# Patient Record
Sex: FEMALE | Race: BLACK OR AFRICAN AMERICAN | NOT HISPANIC OR LATINO | URBAN - METROPOLITAN AREA
[De-identification: names, ages, dates, MRNs, and addresses within clinical notes are randomized per-mention and may not be internally consistent; named-entity substitution may affect disease eponyms.]

---

## 2018-06-07 ENCOUNTER — INPATIENT (INPATIENT)
Facility: HOSPITAL | Age: 68
LOS: 3 days | Discharge: HOME HEALTH | DRG: 640 | End: 2018-06-11
Attending: INTERNAL MEDICINE | Admitting: INTERNAL MEDICINE
Payer: MEDICAID

## 2018-06-07 VITALS
SYSTOLIC BLOOD PRESSURE: 151 MMHG | HEART RATE: 96 BPM | DIASTOLIC BLOOD PRESSURE: 82 MMHG | TEMPERATURE: 98 F | OXYGEN SATURATION: 97 % | RESPIRATION RATE: 18 BRPM

## 2018-06-07 DIAGNOSIS — R62.7 ADULT FAILURE TO THRIVE: ICD-10-CM

## 2018-06-07 LAB
ALBUMIN SERPL ELPH-MCNC: 2.8 G/DL — LOW (ref 3.3–5)
ALBUMIN SERPL ELPH-MCNC: 3.3 G/DL — SIGNIFICANT CHANGE UP (ref 3.3–5)
ALP SERPL-CCNC: 185 U/L — HIGH (ref 40–120)
ALP SERPL-CCNC: 219 U/L — HIGH (ref 40–120)
ALT FLD-CCNC: 13 U/L — SIGNIFICANT CHANGE UP (ref 10–45)
ALT FLD-CCNC: 15 U/L — SIGNIFICANT CHANGE UP (ref 10–45)
ANION GAP SERPL CALC-SCNC: 10 MMOL/L — SIGNIFICANT CHANGE UP (ref 5–17)
ANION GAP SERPL CALC-SCNC: 15 MMOL/L — SIGNIFICANT CHANGE UP (ref 5–17)
APPEARANCE UR: CLEAR — SIGNIFICANT CHANGE UP
APTT BLD: 34.9 SEC — SIGNIFICANT CHANGE UP (ref 27.5–37.4)
AST SERPL-CCNC: 12 U/L — SIGNIFICANT CHANGE UP (ref 10–40)
AST SERPL-CCNC: 12 U/L — SIGNIFICANT CHANGE UP (ref 10–40)
BASE EXCESS BLDV CALC-SCNC: -0.3 MMOL/L — SIGNIFICANT CHANGE UP (ref -2–2)
BASOPHILS # BLD AUTO: 0 K/UL — SIGNIFICANT CHANGE UP (ref 0–0.2)
BASOPHILS NFR BLD AUTO: 0.3 % — SIGNIFICANT CHANGE UP (ref 0–2)
BILIRUB DIRECT SERPL-MCNC: <0.1 MG/DL — SIGNIFICANT CHANGE UP (ref 0–0.2)
BILIRUB INDIRECT FLD-MCNC: >0 MG/DL — LOW (ref 0.2–1)
BILIRUB SERPL-MCNC: 0.1 MG/DL — LOW (ref 0.2–1.2)
BILIRUB SERPL-MCNC: 0.1 MG/DL — LOW (ref 0.2–1.2)
BILIRUB UR-MCNC: NEGATIVE — SIGNIFICANT CHANGE UP
BUN SERPL-MCNC: 45 MG/DL — HIGH (ref 7–23)
BUN SERPL-MCNC: 56 MG/DL — HIGH (ref 7–23)
CA-I SERPL-SCNC: 1.22 MMOL/L — SIGNIFICANT CHANGE UP (ref 1.12–1.3)
CALCIUM SERPL-MCNC: 8.4 MG/DL — SIGNIFICANT CHANGE UP (ref 8.4–10.5)
CALCIUM SERPL-MCNC: 9.1 MG/DL — SIGNIFICANT CHANGE UP (ref 8.4–10.5)
CHLORIDE BLDV-SCNC: 108 MMOL/L — SIGNIFICANT CHANGE UP (ref 96–108)
CHLORIDE SERPL-SCNC: 101 MMOL/L — SIGNIFICANT CHANGE UP (ref 96–108)
CHLORIDE SERPL-SCNC: 105 MMOL/L — SIGNIFICANT CHANGE UP (ref 96–108)
CO2 BLDV-SCNC: 26 MMOL/L — SIGNIFICANT CHANGE UP (ref 22–30)
CO2 SERPL-SCNC: 23 MMOL/L — SIGNIFICANT CHANGE UP (ref 22–31)
CO2 SERPL-SCNC: 24 MMOL/L — SIGNIFICANT CHANGE UP (ref 22–31)
COLOR SPEC: YELLOW — SIGNIFICANT CHANGE UP
COMMENT - URINE: SIGNIFICANT CHANGE UP
CREAT SERPL-MCNC: 0.7 MG/DL — SIGNIFICANT CHANGE UP (ref 0.5–1.3)
CREAT SERPL-MCNC: 1.06 MG/DL — SIGNIFICANT CHANGE UP (ref 0.5–1.3)
DIFF PNL FLD: NEGATIVE — SIGNIFICANT CHANGE UP
EOSINOPHIL # BLD AUTO: 0.2 K/UL — SIGNIFICANT CHANGE UP (ref 0–0.5)
EOSINOPHIL NFR BLD AUTO: 1.4 % — SIGNIFICANT CHANGE UP (ref 0–6)
EPI CELLS # UR: SIGNIFICANT CHANGE UP /HPF
GAS PNL BLDV: 137 MMOL/L — SIGNIFICANT CHANGE UP (ref 136–145)
GAS PNL BLDV: SIGNIFICANT CHANGE UP
GLUCOSE BLDC GLUCOMTR-MCNC: 114 MG/DL — HIGH (ref 70–99)
GLUCOSE BLDC GLUCOMTR-MCNC: 176 MG/DL — HIGH (ref 70–99)
GLUCOSE BLDC GLUCOMTR-MCNC: 251 MG/DL — HIGH (ref 70–99)
GLUCOSE BLDV-MCNC: 137 MG/DL — HIGH (ref 70–99)
GLUCOSE SERPL-MCNC: 123 MG/DL — HIGH (ref 70–99)
GLUCOSE SERPL-MCNC: 133 MG/DL — HIGH (ref 70–99)
GLUCOSE UR QL: NEGATIVE — SIGNIFICANT CHANGE UP
HCO3 BLDV-SCNC: 24 MMOL/L — SIGNIFICANT CHANGE UP (ref 21–29)
HCT VFR BLD CALC: 30.7 % — LOW (ref 34.5–45)
HCT VFR BLD CALC: 35.2 % — SIGNIFICANT CHANGE UP (ref 34.5–45)
HCT VFR BLDA CALC: 33 % — LOW (ref 39–50)
HGB BLD CALC-MCNC: 10.7 G/DL — LOW (ref 11.5–15.5)
HGB BLD-MCNC: 11.2 G/DL — LOW (ref 11.5–15.5)
HGB BLD-MCNC: 9.8 G/DL — LOW (ref 11.5–15.5)
HYALINE CASTS # UR AUTO: ABNORMAL
INR BLD: 1.04 RATIO — SIGNIFICANT CHANGE UP (ref 0.88–1.16)
KETONES UR-MCNC: ABNORMAL
LACTATE BLDV-MCNC: 1.8 MMOL/L — SIGNIFICANT CHANGE UP (ref 0.7–2)
LEUKOCYTE ESTERASE UR-ACNC: NEGATIVE — SIGNIFICANT CHANGE UP
LYMPHOCYTES # BLD AUTO: 18.9 % — SIGNIFICANT CHANGE UP (ref 13–44)
LYMPHOCYTES # BLD AUTO: 2.4 K/UL — SIGNIFICANT CHANGE UP (ref 1–3.3)
MCHC RBC-ENTMCNC: 26.4 PG — LOW (ref 27–34)
MCHC RBC-ENTMCNC: 26.5 PG — LOW (ref 27–34)
MCHC RBC-ENTMCNC: 31.7 GM/DL — LOW (ref 32–36)
MCHC RBC-ENTMCNC: 32 GM/DL — SIGNIFICANT CHANGE UP (ref 32–36)
MCV RBC AUTO: 82.9 FL — SIGNIFICANT CHANGE UP (ref 80–100)
MCV RBC AUTO: 83.3 FL — SIGNIFICANT CHANGE UP (ref 80–100)
MONOCYTES # BLD AUTO: 1 K/UL — HIGH (ref 0–0.9)
MONOCYTES NFR BLD AUTO: 7.3 % — SIGNIFICANT CHANGE UP (ref 2–14)
NEUTROPHILS # BLD AUTO: 9.4 K/UL — HIGH (ref 1.8–7.4)
NEUTROPHILS NFR BLD AUTO: 72.1 % — SIGNIFICANT CHANGE UP (ref 43–77)
NITRITE UR-MCNC: NEGATIVE — SIGNIFICANT CHANGE UP
OTHER CELLS CSF MANUAL: 11 ML/DL — LOW (ref 18–22)
PCO2 BLDV: 43 MMHG — SIGNIFICANT CHANGE UP (ref 35–50)
PH BLDV: 7.38 — SIGNIFICANT CHANGE UP (ref 7.35–7.45)
PH UR: 5.5 — SIGNIFICANT CHANGE UP (ref 5–8)
PLATELET # BLD AUTO: 526 K/UL — HIGH (ref 150–400)
PLATELET # BLD AUTO: 607 K/UL — HIGH (ref 150–400)
PO2 BLDV: 41 MMHG — SIGNIFICANT CHANGE UP (ref 25–45)
POTASSIUM BLDV-SCNC: 4.3 MMOL/L — SIGNIFICANT CHANGE UP (ref 3.5–5.3)
POTASSIUM SERPL-MCNC: 4.1 MMOL/L — SIGNIFICANT CHANGE UP (ref 3.5–5.3)
POTASSIUM SERPL-MCNC: 4.7 MMOL/L — SIGNIFICANT CHANGE UP (ref 3.5–5.3)
POTASSIUM SERPL-SCNC: 4.1 MMOL/L — SIGNIFICANT CHANGE UP (ref 3.5–5.3)
POTASSIUM SERPL-SCNC: 4.7 MMOL/L — SIGNIFICANT CHANGE UP (ref 3.5–5.3)
PROT SERPL-MCNC: 6.6 G/DL — SIGNIFICANT CHANGE UP (ref 6–8.3)
PROT SERPL-MCNC: 7.9 G/DL — SIGNIFICANT CHANGE UP (ref 6–8.3)
PROT UR-MCNC: 30 MG/DL
PROTHROM AB SERPL-ACNC: 11.3 SEC — SIGNIFICANT CHANGE UP (ref 9.8–12.7)
RBC # BLD: 3.71 M/UL — LOW (ref 3.8–5.2)
RBC # BLD: 4.22 M/UL — SIGNIFICANT CHANGE UP (ref 3.8–5.2)
RBC # FLD: 17.4 % — HIGH (ref 10.3–14.5)
RBC # FLD: 17.7 % — HIGH (ref 10.3–14.5)
SAO2 % BLDV: 73 % — SIGNIFICANT CHANGE UP (ref 67–88)
SODIUM SERPL-SCNC: 138 MMOL/L — SIGNIFICANT CHANGE UP (ref 135–145)
SODIUM SERPL-SCNC: 140 MMOL/L — SIGNIFICANT CHANGE UP (ref 135–145)
SP GR SPEC: 1.02 — SIGNIFICANT CHANGE UP (ref 1.01–1.02)
URATE CRY FLD QL MICRO: ABNORMAL
UROBILINOGEN FLD QL: NEGATIVE — SIGNIFICANT CHANGE UP
WBC # BLD: 10.6 K/UL — HIGH (ref 3.8–10.5)
WBC # BLD: 13 K/UL — HIGH (ref 3.8–10.5)
WBC # FLD AUTO: 10.6 K/UL — HIGH (ref 3.8–10.5)
WBC # FLD AUTO: 13 K/UL — HIGH (ref 3.8–10.5)
WBC UR QL: SIGNIFICANT CHANGE UP /HPF (ref 0–5)

## 2018-06-07 PROCEDURE — 74177 CT ABD & PELVIS W/CONTRAST: CPT | Mod: 26

## 2018-06-07 PROCEDURE — 71045 X-RAY EXAM CHEST 1 VIEW: CPT | Mod: 26

## 2018-06-07 PROCEDURE — 70450 CT HEAD/BRAIN W/O DYE: CPT | Mod: 26

## 2018-06-07 PROCEDURE — 73110 X-RAY EXAM OF WRIST: CPT | Mod: 26,LT

## 2018-06-07 PROCEDURE — 73070 X-RAY EXAM OF ELBOW: CPT | Mod: 26,LT

## 2018-06-07 PROCEDURE — 72132 CT LUMBAR SPINE W/DYE: CPT | Mod: 26

## 2018-06-07 PROCEDURE — 72170 X-RAY EXAM OF PELVIS: CPT | Mod: 26

## 2018-06-07 PROCEDURE — 93010 ELECTROCARDIOGRAM REPORT: CPT

## 2018-06-07 PROCEDURE — 99285 EMERGENCY DEPT VISIT HI MDM: CPT | Mod: 25

## 2018-06-07 PROCEDURE — 93970 EXTREMITY STUDY: CPT | Mod: 26

## 2018-06-07 PROCEDURE — 73610 X-RAY EXAM OF ANKLE: CPT | Mod: 26,50

## 2018-06-07 PROCEDURE — 73030 X-RAY EXAM OF SHOULDER: CPT | Mod: 26,LT

## 2018-06-07 RX ORDER — ACETAMINOPHEN 500 MG
650 TABLET ORAL ONCE
Qty: 0 | Refills: 0 | Status: COMPLETED | OUTPATIENT
Start: 2018-06-07 | End: 2018-06-07

## 2018-06-07 RX ORDER — INSULIN LISPRO 100/ML
VIAL (ML) SUBCUTANEOUS AT BEDTIME
Qty: 0 | Refills: 0 | Status: DISCONTINUED | OUTPATIENT
Start: 2018-06-07 | End: 2018-06-11

## 2018-06-07 RX ORDER — FLUCONAZOLE 150 MG/1
100 TABLET ORAL ONCE
Qty: 0 | Refills: 0 | Status: COMPLETED | OUTPATIENT
Start: 2018-06-07 | End: 2018-06-07

## 2018-06-07 RX ORDER — PIPERACILLIN AND TAZOBACTAM 4; .5 G/20ML; G/20ML
3.38 INJECTION, POWDER, LYOPHILIZED, FOR SOLUTION INTRAVENOUS EVERY 8 HOURS
Qty: 0 | Refills: 0 | Status: DISCONTINUED | OUTPATIENT
Start: 2018-06-07 | End: 2018-06-11

## 2018-06-07 RX ORDER — PANTOPRAZOLE SODIUM 20 MG/1
40 TABLET, DELAYED RELEASE ORAL
Qty: 0 | Refills: 0 | Status: DISCONTINUED | OUTPATIENT
Start: 2018-06-07 | End: 2018-06-08

## 2018-06-07 RX ORDER — DEXTROSE 50 % IN WATER 50 %
15 SYRINGE (ML) INTRAVENOUS ONCE
Qty: 0 | Refills: 0 | Status: DISCONTINUED | OUTPATIENT
Start: 2018-06-07 | End: 2018-06-11

## 2018-06-07 RX ORDER — METOPROLOL TARTRATE 50 MG
25 TABLET ORAL
Qty: 0 | Refills: 0 | Status: DISCONTINUED | OUTPATIENT
Start: 2018-06-07 | End: 2018-06-11

## 2018-06-07 RX ORDER — DEXTROSE 50 % IN WATER 50 %
12.5 SYRINGE (ML) INTRAVENOUS ONCE
Qty: 0 | Refills: 0 | Status: DISCONTINUED | OUTPATIENT
Start: 2018-06-07 | End: 2018-06-11

## 2018-06-07 RX ORDER — SODIUM CHLORIDE 9 MG/ML
1000 INJECTION, SOLUTION INTRAVENOUS
Qty: 0 | Refills: 0 | Status: DISCONTINUED | OUTPATIENT
Start: 2018-06-07 | End: 2018-06-11

## 2018-06-07 RX ORDER — LISINOPRIL 2.5 MG/1
5 TABLET ORAL DAILY
Qty: 0 | Refills: 0 | Status: DISCONTINUED | OUTPATIENT
Start: 2018-06-07 | End: 2018-06-11

## 2018-06-07 RX ORDER — GLUCAGON INJECTION, SOLUTION 0.5 MG/.1ML
1 INJECTION, SOLUTION SUBCUTANEOUS ONCE
Qty: 0 | Refills: 0 | Status: DISCONTINUED | OUTPATIENT
Start: 2018-06-07 | End: 2018-06-11

## 2018-06-07 RX ORDER — DEXTROSE 50 % IN WATER 50 %
25 SYRINGE (ML) INTRAVENOUS ONCE
Qty: 0 | Refills: 0 | Status: DISCONTINUED | OUTPATIENT
Start: 2018-06-07 | End: 2018-06-11

## 2018-06-07 RX ORDER — INSULIN LISPRO 100/ML
VIAL (ML) SUBCUTANEOUS
Qty: 0 | Refills: 0 | Status: DISCONTINUED | OUTPATIENT
Start: 2018-06-07 | End: 2018-06-11

## 2018-06-07 RX ORDER — ENOXAPARIN SODIUM 100 MG/ML
40 INJECTION SUBCUTANEOUS DAILY
Qty: 0 | Refills: 0 | Status: DISCONTINUED | OUTPATIENT
Start: 2018-06-07 | End: 2018-06-11

## 2018-06-07 RX ORDER — VANCOMYCIN HCL 1 G
1000 VIAL (EA) INTRAVENOUS ONCE
Qty: 0 | Refills: 0 | Status: COMPLETED | OUTPATIENT
Start: 2018-06-07 | End: 2018-06-07

## 2018-06-07 RX ORDER — NYSTATIN CREAM 100000 [USP'U]/G
1 CREAM TOPICAL
Qty: 0 | Refills: 0 | Status: DISCONTINUED | OUTPATIENT
Start: 2018-06-07 | End: 2018-06-08

## 2018-06-07 RX ADMIN — Medication 1: at 23:01

## 2018-06-07 RX ADMIN — Medication 25 MILLIGRAM(S): at 18:06

## 2018-06-07 RX ADMIN — Medication 1: at 18:05

## 2018-06-07 RX ADMIN — PANTOPRAZOLE SODIUM 40 MILLIGRAM(S): 20 TABLET, DELAYED RELEASE ORAL at 13:09

## 2018-06-07 RX ADMIN — FLUCONAZOLE 100 MILLIGRAM(S): 150 TABLET ORAL at 20:27

## 2018-06-07 RX ADMIN — Medication 250 MILLIGRAM(S): at 03:44

## 2018-06-07 RX ADMIN — ENOXAPARIN SODIUM 40 MILLIGRAM(S): 100 INJECTION SUBCUTANEOUS at 13:09

## 2018-06-07 RX ADMIN — Medication 650 MILLIGRAM(S): at 20:27

## 2018-06-07 RX ADMIN — PIPERACILLIN AND TAZOBACTAM 25 GRAM(S): 4; .5 INJECTION, POWDER, LYOPHILIZED, FOR SOLUTION INTRAVENOUS at 22:29

## 2018-06-07 RX ADMIN — PIPERACILLIN AND TAZOBACTAM 25 GRAM(S): 4; .5 INJECTION, POWDER, LYOPHILIZED, FOR SOLUTION INTRAVENOUS at 13:09

## 2018-06-07 RX ADMIN — LISINOPRIL 5 MILLIGRAM(S): 2.5 TABLET ORAL at 13:09

## 2018-06-07 RX ADMIN — NYSTATIN CREAM 1 APPLICATION(S): 100000 CREAM TOPICAL at 22:29

## 2018-06-07 NOTE — CONSULT NOTE ADULT - SUBJECTIVE AND OBJECTIVE BOX
Orthopaedic Surgery Consult Note    Patient is a 67y old  Female who presents with a chief complaint of fall from wheelchair 2 weeks ago in [place]. Patient is a functional paraplegic and had a home health aid, but unclear who was caring for patient 2 weeks ago. We were consulted for the multiple chronic fractures seen at left distal ulna. Patient has no pain or tenderness at left ulna. Patient has unchanged chronic pain at right shoulder. Patient's main complaint is at left lower extremity and lower back where she has ulcers. Otherwise, no fevers, no changing paresthesias.       PAST MEDICAL & SURGICAL HISTORY:    [] No significant past history as reviewed with the patient and family    FAMILY HISTORY:    [] Family history not pertinent as reviewed with the patient and family    SOCIAL HISTORY:    MEDICATIONS  (STANDING):  dextrose 5%. 1000 milliLiter(s) (50 mL/Hr) IV Continuous <Continuous>  dextrose 50% Injectable 12.5 Gram(s) IV Push once  dextrose 50% Injectable 25 Gram(s) IV Push once  dextrose 50% Injectable 25 Gram(s) IV Push once  enoxaparin Injectable 40 milliGRAM(s) SubCutaneous daily  fluconAZOLE   Tablet 100 milliGRAM(s) Oral once  insulin lispro (HumaLOG) corrective regimen sliding scale   SubCutaneous three times a day before meals  lisinopril 5 milliGRAM(s) Oral daily  metoprolol tartrate 25 milliGRAM(s) Oral two times a day  nystatin Cream 1 Application(s) Topical two times a day  pantoprazole    Tablet 40 milliGRAM(s) Oral before breakfast  piperacillin/tazobactam IVPB. 3.375 Gram(s) IV Intermittent every 8 hours    MEDICATIONS  (PRN):  dextrose 40% Gel 15 Gram(s) Oral once PRN Blood Glucose LESS THAN 70 milliGRAM(s)/deciliter  glucagon  Injectable 1 milliGRAM(s) IntraMuscular once PRN Glucose LESS THAN 70 milligrams/deciliter    Allergies    No Known Allergies    Intolerances        Vital Signs Last 24 Hrs  T(C): 36.4 (2018 10:25), Max: 36.8 (2018 07:40)  T(F): 97.5 (2018 10:25), Max: 98.3 (2018 07:40)  HR: 95 (2018 10:25) (94 - 96)  BP: 151/79 (2018 10:25) (131/68 - 160/82)  BP(mean): --  RR: 18 (2018 10:25) (17 - 18)  SpO2: 95% (2018 10:25) (95% - 98%)      PHYSICAL EXAM:  NAD  RUE: skin intact, ttp at shoulder, no ttp at elbow/wrist, able to flex and abduct to 90, radial pulse intact  LUE: Shoulder no TTP, elbow no TTP, wrist no TTP, chronic contracture with passive ROM from  degrees flexion, full wrist flexion/extension  Spine: No tenderness to palpation cervical/thoracic/lumbar/sacral  Back: buttock ulcer  RLE/LLE: 0/5 TA/EHL/FHL; 3/5 IP/Quads, able to flex at hip and knee, no gross movement at ankles, good capillary refill, sensation intact b/l  LLE: ulcer left ankle over lateral mal                          9.8    10.6  )-----------( 526      ( 2018 10:05 )             30.7     06-07    138  |  105  |  45<H>  ----------------------------<  123<H>  4.1   |  23  |  0.70    Ca    8.4      2018 10:05    TPro  6.6  /  Alb  2.8<L>  /  TBili  0.1<L>  /  DBili  <0.1  /  AST  12  /  ALT  13  /  AlkPhos  185<H>  06-07    PT/INR - ( 2018 03:11 )   PT: 11.3 sec;   INR: 1.04 ratio         PTT - ( 2018 03:11 )  PTT:34.9 sec  Urinalysis Basic - ( 2018 03:39 )    Color: Yellow / Appearance: Clear / S.025 / pH: x  Gluc: x / Ketone: Trace  / Bili: Negative / Urobili: Negative   Blood: x / Protein: 30 mg/dL / Nitrite: Negative   Leuk Esterase: Negative / RBC: x / WBC 3-5 /HPF   Sq Epi: x / Non Sq Epi: OCC /HPF / Bacteria: x        IMAGING STUDIES:    67y Female with healed left distal ulna fracture and chronic shoulder and elbow deformities  - WBAT b/l shoulder, elbow, wrist  - no further imaging required  - evaluate social issues for multiple fractures at different stages of healing and ulcers  - no orthopedic intervention necessary  - can follow up in general [facility] as needed

## 2018-06-07 NOTE — ED ADULT NURSE REASSESSMENT NOTE - NS ED NURSE REASSESS COMMENT FT1
Patient resting in bed comfortably. Swelling noted on L shoulder area with L arm deformity noted. +strength and ROM of L arm. +and equal pulses of all four extremities. Dressing noted on b/l buttock. Deformity noted on b/l feet. +3 swelling noted of L lower extremity. Wound noted on L lateral ankle. +sensation of lower extremity. Pt unable to move b/l lower extremities. Pt is in no current distress. Comfort and safety provided. Will continue to monitor. Awaiting ortho consult and admittance. 0720 Report received from [facility]   Patient resting in bed comfortably. Swelling noted on L shoulder area with L arm deformity noted. +strength and ROM of L arm. +and equal pulses of all four extremities. Dressing noted on b/l buttock. Deformity noted on b/l feet. +3 swelling noted of L lower extremity. Wound noted on L lateral ankle. +sensation of lower extremity. Pt unable to move b/l lower extremities. Pt is in no current distress. Comfort and safety provided. Will continue to monitor. Awaiting ortho consult and admittance. 0720 Report received from [facility]   Patient resting in bed comfortably. Swelling noted on L shoulder area with L arm deformity noted. +strength and ROM of L arm. +and equal pulses of all four extremities. Small open wound noted underneath R breast - not actively bleeding. Dressing noted on b/l buttock. Deformity noted on b/l feet. +3 swelling noted of L lower extremity. Wound noted on L lateral ankle. +sensation of lower extremity. Pt unable to move b/l lower extremities. Pt is in no current distress. Comfort and safety provided. Will continue to monitor. Awaiting ortho consult and admittance. 0720 Report received from [facility]   Patient resting in bed comfortably. Redness and swelling noted on L shoulder area with L arm deformity noted. +strength and ROM of L arm. +and equal pulses of all four extremities. Small open wound noted underneath R breast - not actively bleeding. Dressing noted on b/l buttock. Deformity noted on b/l feet. +3 swelling noted of L lower extremity. Wound noted on L lateral ankle. +sensation of lower extremity. Pt unable to move b/l lower extremities. Pt is in no current distress. Comfort and safety provided. Will continue to monitor. Awaiting ortho consult and admittance.

## 2018-06-07 NOTE — ED PROVIDER NOTE - PROGRESS NOTE DETAILS
patient found to have missing L humeral head with significant dislocation of humerus from joint space. orthopedics consulted stating will come to see patient. -Lori Kaur PA-C

## 2018-06-07 NOTE — H&P ADULT - NSHPLABSRESULTS_GEN_ALL_CORE
LABS:                        9.8    10.6  )-----------( 526      ( 2018 10:05 )             30.7     -    140  |  101  |  56<H>  ----------------------------<  133<H>  4.7   |  24  |  1.06    Ca    9.1      2018 03:11    TPro  7.9  /  Alb  3.3  /  TBili  0.1<L>  /  DBili  x   /  AST  12  /  ALT  15  /  AlkPhos  219<H>      PT/INR - ( 2018 03:11 )   PT: 11.3 sec;   INR: 1.04 ratio         PTT - ( 2018 03:11 )  PTT:34.9 sec      Urinalysis Basic - ( 2018 03:39 )    Color: Yellow / Appearance: Clear / S.025 / pH: x  Gluc: x / Ketone: Trace  / Bili: Negative / Urobili: Negative   Blood: x / Protein: 30 mg/dL / Nitrite: Negative   Leuk Esterase: Negative / RBC: x / WBC 3-5 /HPF   Sq Epi: x / Non Sq Epi: OCC /HPF / Bacteria: x          - @ 03:11  4.3  41

## 2018-06-07 NOTE — ED PROVIDER NOTE - PHYSICAL EXAMINATION
GEN: Well Appearing, Nontoxic, NAD  HEENT: Symm Facies, PERRL, EOMI, MMM, posterior pharynx clear  CV: No JVD/Bruits or stridor;  RRR w/o m/g/r  RESP: CTAB w/o w/r/r  ABD: Soft, nt/nd, +bs, no guarding/rebound, no RUQ tender;  No CVAT  EXT: No lower extremity edema or calf tenderness. WWP, palpable pulses. FROMx4. R ankle with deformity from old injury and b/l ankle edema with weeping on L ankle and unstageable ulcer to lateral L ankle at malleolus.   SKIN: swelling and erythema to L shoulder. bilateral pannus and groin skin breakdown with erythematous base, nonblanching. buttocks with bleeding skin breakdown. R buttock and L buttock inferiorly with stage 4 ulcer  Neuro: Grossly intact, AOX2 with normal speech, CN II-XII intact; Sensation intact.   deformity of L forearm from previous injury

## 2018-06-07 NOTE — CONSULT NOTE ADULT - SUBJECTIVE AND OBJECTIVE BOX
HPI:   Patient is a 67y female with past medical history of htn, dm, brought in by daughter for concern for multiple falls and inability to care for herself. Daughter reports patient lives in [place], and has a home health aide that is in the house with her throughout the day. reviewed notes in the chart the patient had sustained a fall while in [place]. The patient also reports that she developed sacral ulcer so given events with fall the daughter flew the patient to [place] and brought to the ER to be evaluated. On review of systems she reports that she has dysuria, she reports that 2-3 weeks ago she was treated for a UTI in [place].     PAST MEDICAL & SURGICAL HISTORY:  hypertension   diabetes         Allergies    No Known Allergies    Intolerances        Antimicrobials Day #    fluconAZOLE   Tablet 100 milliGRAM(s) Oral once  piperacillin/tazobactam IVPB. 3.375 Gram(s) IV Intermittent every 8 hours    Other Medications:  dextrose 40% Gel 15 Gram(s) Oral once PRN  dextrose 5%. 1000 milliLiter(s) IV Continuous <Continuous>  dextrose 50% Injectable 12.5 Gram(s) IV Push once  dextrose 50% Injectable 25 Gram(s) IV Push once  dextrose 50% Injectable 25 Gram(s) IV Push once  enoxaparin Injectable 40 milliGRAM(s) SubCutaneous daily  glucagon  Injectable 1 milliGRAM(s) IntraMuscular once PRN  insulin lispro (HumaLOG) corrective regimen sliding scale   SubCutaneous three times a day before meals  lisinopril 5 milliGRAM(s) Oral daily  metoprolol tartrate 25 milliGRAM(s) Oral two times a day  nystatin Cream 1 Application(s) Topical two times a day  pantoprazole    Tablet 40 milliGRAM(s) Oral before breakfast      FAMILY HISTORY: non contributory       SOCIAL HISTORY:  Smoking: NO     ETOH:  NO       T(F): 97.5 (18 @ 10:25), Max: 98.3 (18 @ 07:40)  HR: 95 (18 @ 10:25)  BP: 151/79 (18 @ 10:25)  RR: 18 (18 @ 10:25)  SpO2: 95% (18 @ 10:25)  Wt(kg): --    PHYSICAL EXAM:  General: alert, no acute distress  Eyes:  anicteric, no conjunctival injection, no discharge  Oropharynx: no lesions or injection 	  Neck: supple, without adenopathy  Lungs: clear to auscultation  Heart: regular rate and rhythm; no murmur, rubs or gallops  Abdomen: soft, nondistended, nontender, without mass or organomegaly  Skin: no lesions  Extremities: + LE edema Left greater than right  Neurologic: alert, oriented, moves all extremities    LAB RESULTS:                        9.8    10.6  )-----------( 526      ( 2018 10:05 )             30.7         138  |  105  |  45<H>  ----------------------------<  123<H>  4.1   |  23  |  0.70    Ca    8.4      2018 10:05    TPro  6.6  /  Alb  2.8<L>  /  TBili  0.1<L>  /  DBili  <0.1  /  AST  12  /  ALT  13  /  AlkPhos  185<H>      LIVER FUNCTIONS - ( 2018 10:05 )  Alb: 2.8 g/dL / Pro: 6.6 g/dL / ALK PHOS: 185 U/L / ALT: 13 U/L / AST: 12 U/L / GGT: x           Urinalysis Basic - ( 2018 03:39 )    Color: Yellow / Appearance: Clear / S.025 / pH: x  Gluc: x / Ketone: Trace  / Bili: Negative / Urobili: Negative   Blood: x / Protein: 30 mg/dL / Nitrite: Negative   Leuk Esterase: Negative / RBC: x / WBC 3-5 /HPF   Sq Epi: x / Non Sq Epi: OCC /HPF / Bacteria: x        MICROBIOLOGY:  RECENT CULTURES:        RADIOLOGY REVIEWED:

## 2018-06-07 NOTE — H&P ADULT - NSHPPHYSICALEXAM_GEN_ALL_CORE
PHYSICAL EXAMINATION:  Vital Signs Last 24 Hrs  T(C): 36.4 (07 Jun 2018 10:25), Max: 36.8 (07 Jun 2018 07:40)  T(F): 97.5 (07 Jun 2018 10:25), Max: 98.3 (07 Jun 2018 07:40)  HR: 95 (07 Jun 2018 10:25) (94 - 96)  BP: 151/79 (07 Jun 2018 10:25) (131/68 - 160/82)  BP(mean): --  RR: 18 (07 Jun 2018 10:25) (17 - 18)  SpO2: 95% (07 Jun 2018 10:25) (95% - 98%)  CAPILLARY BLOOD GLUCOSE      POCT Blood Glucose.: 134 mg/dL (07 Jun 2018 02:20)        GENERAL: NAD, well-groomed,  HEAD:  atraumatic, normocephalic  EYES: sclera anicteric  ENMT: mucous membranes moist  NECK: supple, No JVD  CHEST/LUNG: clear to auscultation bilaterally;    no      rales   ,   no rhonchi,   HEART: normal S1, S2  ABDOMEN: BS+, soft, ND, NT   EXTREMITIES:    no    edema    b/l LEs  NEURO: awake, ,,   sacral decubiti  SKIN: no     rash PHYSICAL EXAMINATION:  Vital Signs Last 24 Hrs  T(C): 36.4 (07 Jun 2018 10:25), Max: 36.8 (07 Jun 2018 07:40)  T(F): 97.5 (07 Jun 2018 10:25), Max: 98.3 (07 Jun 2018 07:40)  HR: 95 (07 Jun 2018 10:25) (94 - 96)  BP: 151/79 (07 Jun 2018 10:25) (131/68 - 160/82)  BP(mean): --  RR: 18 (07 Jun 2018 10:25) (17 - 18)  SpO2: 95% (07 Jun 2018 10:25) (95% - 98%)  CAPILLARY BLOOD GLUCOSE      POCT Blood Glucose.: 134 mg/dL (07 Jun 2018 02:20)        GENERAL: NAD, well-groomed,  HEAD:  atraumatic, normocephalic  EYES: sclera anicteric  ENMT: mucous membranes moist  NECK: supple, No JVD  CHEST/LUNG: clear to auscultation bilaterally;    no      rales   ,   no rhonchi,   HEART: normal S1, S2  ABDOMEN: BS+, soft, ND, NT   EXTREMITIES:   b/l  3+  edema    b/l LEs  NEURO: awake, ,,. b/l  leg weakness.old,  unable to raise  left arm, from prior  injury  skin, dermatitis,  groin area, redness,  left clavicular area   sacral decubiti  SKIN: no     rash PHYSICAL EXAMINATION:  Vital Signs Last 24 Hrs  T(C): 36.4 (07 Jun 2018 10:25), Max: 36.8 (07 Jun 2018 07:40)  T(F): 97.5 (07 Jun 2018 10:25), Max: 98.3 (07 Jun 2018 07:40)  HR: 95 (07 Jun 2018 10:25) (94 - 96)  BP: 151/79 (07 Jun 2018 10:25) (131/68 - 160/82)  BP(mean): --  RR: 18 (07 Jun 2018 10:25) (17 - 18)  SpO2: 95% (07 Jun 2018 10:25) (95% - 98%)  CAPILLARY BLOOD GLUCOSE      POCT Blood Glucose.: 134 mg/dL (07 Jun 2018 02:20)        GENERAL: NAD, well-groomed,  HEAD:  atraumatic, normocephalic  EYES: sclera anicteric  ENMT: mucous membranes moist  NECK: supple, No JVD  CHEST/LUNG: clear to auscultation bilaterally;    no      rales   ,   no rhonchi,   HEART: normal S1, S2,  skin, dermatitis,  groin area, redness,  left clavicular area   sacral decubiti  SKIN:   groin rash  unable  to  raise  left arm/  flex wrist, ,  old  per  family

## 2018-06-07 NOTE — ED PROVIDER NOTE - ATTENDING CONTRIBUTION TO CARE
66y/o F with h/o HTN DM prior lumbar spine injury (2/2 remote h/o fall, with residual sensory loss and weakness, baseline uses wheelchair), brought to this hospital by daughter from home in [place]; apparently HHA unable to care for her for past several weeks; daughter visited and found patient to be in poor state of health, with bed sores and skin breakdown; brought her on plane and to this E.D.    On exam, patient has extensive sacral and gluteal breakdown, with dermis visible and several unstageable wounds as described above.  Unable to explain why she is here. Will need extensive wound care; plan for admission.

## 2018-06-07 NOTE — ED PROVIDER NOTE - MEDICAL DECISION MAKING DETAILS
68y/o F with h/o HTN DM prior lumbar spine injury (2/2 remote h/o fall, with residual sensory loss and weakness, baseline uses wheelchair), brought to this hospital by daughter from home in [place]; apparently HHA unable to care for her for past several weeks; daughter visited and found patient to be in poor state of health, with bed sores and skin breakdown; brought her on plane and to this E.D.    On exam, patient has extensive sacral and gluteal breakdown, with dermis visible and several unstageable wounds as described above.  Unable to explain why she is here. Will need extensive wound care; plan for admission.

## 2018-06-07 NOTE — H&P ADULT - NSHPREVIEWOFSYSTEMS_GEN_ALL_CORE
REVIEW OF SYSTEMS:  CONSTITUTIONAL:   c/c weakness,  no   fevers      RESPIRATORY:  No    shortness of breath  , no  wheezing  CARDIOVASCULAR: No chest pain,   no  palpitations, no  cough  GASTROINTESTINAL: No abdominal  pain, no  vomiting, no  diarrhea  NEUROLOGICAL: No  focal  weakness

## 2018-06-07 NOTE — H&P ADULT - ASSESSMENT
pt    with h/o  htn,    dm  2, mlple falls, now  wheel chair bound  , s/p  h/o traumatic fall, from window   now  with ftt/ sacral  decubiti   wound care  and ID  called  pt  eval  anemia, gi  called  dvt ppx  med list  not available  from [place]  family also  wants placement pt    with h/o  htn,    dm  2, mlple falls, now  wheel chair bound.  beb bound, functional  quadriplegia  , s/p  h/o traumatic fall, from window, 20  yrs  ago   now  with ftt/ sacral  decubiti   wound care  and ID  called  pt  eval  anemia, gi  called  dvt ppx  med list  not available  from [place],  family states she  takes  bp  meds  and  metformin  family also  wants placement  doppler  legs pt    with h/o  htn,    dm  2, mlple falls, now  wheel chair bound.  beb bound, functional  quadriplegia  , s/p  h/o traumatic fall, from window, 20  yrs  ago   now  with ftt/ sacral  decubiti   wound care  and ID  called  pt  eval  anemia, gi  called  dvt ppx  med list  not available  from [place],  family  at  bedside now,  states she  takes  bp  meds/ cozaar   and  metformin  family also  wants placement  doppler  legs.  distal ulnar fx left  arm, ortho called,  sttates,  plan is  non operative    < from: Xray Shoulder 2 Views, Left (06.07.18 @ 04:51) >    IMPRESSION: There is chronic resorption of the humeral head with chronic   remodeling of the glenoid articular surface with adjacent osseous   productive change. This may be compatible with a neuropathic joint. There   is dislocation of the humeral shaft with respect to the glenoid. No acute   fracture is seen.    < from: Xray Wrist 3 Views, Left (06.07.18 @ 04:47) >    IMPRESSION:     Acute mildly displaced distal ulna fracture.      < from: Xray Elbow AP + Lateral, Left (06.07.18 @ 04:47) >  COMPARISON: None.    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 There is no obvious   acute fracture identified. There is mild deformity and cortical   thickening at the mid shaft of the ulna related to healing of a prior   fracture.            < end of copied text >

## 2018-06-07 NOTE — CONSULT NOTE ADULT - SUBJECTIVE AND OBJECTIVE BOX
Patient is a 67y old  Female who presents with a chief complaint of ftt (2018 10:40)      HPI:     67y Female complaining of  decubiti.	  : 66 y/o F pmhx htn, dm, brought in by daughter for concern for multiple falls, from wheel chair,  bed bound  for  more than  20 years,  and inability to care for herself.  Daughter reports patient lives in [place], and has a home health aide that is in the house with her throughout the day.. . Daughter reports that 2 weeks ago patient had a t fall that was unwitnessed,  Daughter states that patient uses a wheelchair at baseline, . States that daughter went down to [place] to assess situation x2 days ago and noted that patient had ulcers to her buttocks, was complaining of back pain that was worse than her usual, and daughter was significantly concerned about her ability to care for self. Brought her up to [place] immediately.  Patient has a history of a traumatic injury in remote past consisting of falling out of a window; has had multiple surgeries, back injury causing some baseline loss of sensation in lower body, and is wheelchair bound at baseline. (2018 10:40)           *****PAST MEDICAL / Surgical  HISTORY:  PAST MEDICAL & SURGICAL HISTORY:           *****FAMILY HISTORY:  FAMILY HISTORY:           *****SOCIAL HISTORY:  Alcohol: None  Smoking: None         *****ALLERGIES:   Allergies    No Known Allergies    Intolerances             *****MEDICATIONS:  MEDICATIONS  (STANDING):  dextrose 5%. 1000 milliLiter(s) (50 mL/Hr) IV Continuous <Continuous>  dextrose 50% Injectable 12.5 Gram(s) IV Push once  dextrose 50% Injectable 25 Gram(s) IV Push once  dextrose 50% Injectable 25 Gram(s) IV Push once  enoxaparin Injectable 40 milliGRAM(s) SubCutaneous daily  fluconAZOLE   Tablet 100 milliGRAM(s) Oral once  insulin lispro (HumaLOG) corrective regimen sliding scale   SubCutaneous three times a day before meals  lisinopril 5 milliGRAM(s) Oral daily  metoprolol tartrate 25 milliGRAM(s) Oral two times a day  nystatin Cream 1 Application(s) Topical two times a day  pantoprazole    Tablet 40 milliGRAM(s) Oral before breakfast  piperacillin/tazobactam IVPB. 3.375 Gram(s) IV Intermittent every 8 hours    MEDICATIONS  (PRN):  dextrose 40% Gel 15 Gram(s) Oral once PRN Blood Glucose LESS THAN 70 milliGRAM(s)/deciliter  glucagon  Injectable 1 milliGRAM(s) IntraMuscular once PRN Glucose LESS THAN 70 milligrams/deciliter           *****REVIEW OF SYSTEM:  GEN: no fever, no chills, no pain  RESP: no SOB, no cough, no sputum  CVS: no chest pain, no palpitations, no edema  GI: no abdominal pain, no nausea, no vomiting, no constipation, no diarrhea  : no dysurea, no frequency, no hematurea  Neuro: no headache, no dizziness  PSYCH: no anxiety, no depression  Derm : no itching, no rash         *****VITAL SIGNS:  T(C): 36.4 (18 @ 10:25), Max: 36.8 (18 @ 07:40)  HR: 95 (18 @ 10:25) (94 - 96)  BP: 151/79 (18 @ 10:25) (131/68 - 160/82)  RR: 18 (18 @ 10:25) (17 - 18)  SpO2: 95% (18 @ 10:25) (95% - 98%)  Wt(kg): --           *****PHYSICAL EXAM:  GEN: A&O X 3 , NAD , comfortable  HEENT: NCAT, PERRL, MMM, hearing intact  Neck: supple , no JVD  CVS: S1S2 , regular , No M/R/G appreciated  PULM: CTA B/L,  no W/R/R appreciated  ABD.: soft. non tender, non distended,  bowel sounds present  Extrem: intact pulses , ++++edema   Derm: No rash , no ecchymoses  PSYCH : normal mood,  no delusion not anxious         *****LAB AND IMAGIN.8    10.6  )-----------( 526      ( 2018 10:05 )             30.7               -    138  |  105  |  45<H>  ----------------------------<  123<H>  4.1   |  23  |  0.70    Ca    8.4      2018 10:05    TPro  6.6  /  Alb  2.8<L>  /  TBili  0.1<L>  /  DBili  <0.1  /  AST  12  /  ALT  13  /  AlkPhos  185<H>      PT/INR - ( 2018 03:11 )   PT: 11.3 sec;   INR: 1.04 ratio         PTT - ( 2018 03:11 )  PTT:34.9 sec                        Urinalysis Basic - ( 2018 03:39 )    Color: Yellow / Appearance: Clear / S.025 / pH: x  Gluc: x / Ketone: Trace  / Bili: Negative / Urobili: Negative   Blood: x / Protein: 30 mg/dL / Nitrite: Negative   Leuk Esterase: Negative / RBC: x / WBC 3-5 /HPF   Sq Epi: x / Non Sq Epi: OCC /HPF / Bacteria: x        [All pertinent recent Imaging reports reviewed]         *****A S S E S S M E N T   A N D   P L A N :  67F Hx htn, dm, multiple falls from wheel chair, FTT   wheelchair bound/functional quadriplegia s/p prior trauma  on cozaar for htn at home (?dose)  started on BB, ACE  lasix for O>I  check albumin  LE doppler  wound care  echo  PT    ___________________________  Will follow with you.  Thank you,  AIDA Arboleda D.O.

## 2018-06-07 NOTE — CONSULT NOTE ADULT - SUBJECTIVE AND OBJECTIVE BOX
Patient is a 67y old  Female who presents with a chief complaint of ftt (2018 10:40)      HPI:  High Risk Travel:  International Travel? No(1)     67y Female complaining of  decubiti.	  : 68 y/o F pmhx htn, dm, brought in by daughter for concern for multiple falls, from wheel chair,  bed bound  for  more than  20 years,  and inability to care for herself.  Daughter reports patient lives in [place], and has a home health aide that is in the house with her throughout the day.. . Daughter reports that 2 weeks ago patient had a fall that was unwitnessed,  Daughter states that patient uses a wheelchair at baseline, . States that daughter went down to [place] to assess situation x2 days ago and noted that patient had ulcers to her buttocks, was complaining of back pain that was worse than her usual, and daughter was significantly concerned about her ability to care for self. Brought her up to [place] immediately.  Patient has a history of a traumatic injury in remote past consisting of falling out of a window; has had multiple surgeries, back injury causing some baseline loss of sensation in lower body, and is wheelchair bound at baseline. (2018 10:40)    denies BRBPR, melana, dairrhea Last BM 3 days ago  appetite "ok" no reported nausea or vomiting, no abdominal pain  no dysphagia or odynophagia  +multiple skin wounds - groin/ ankle/ sacrum  c/o sacral pain    PAST MEDICAL & SURGICAL HISTORY:  HTN   DM  decubiti  functional quadriplegia s/p trauma    Allergies  No Known Allergies        MEDICATIONS  (STANDING):  dextrose 5%. 1000 milliLiter(s) (50 mL/Hr) IV Continuous <Continuous>  dextrose 50% Injectable 12.5 Gram(s) IV Push once  dextrose 50% Injectable 25 Gram(s) IV Push once  dextrose 50% Injectable 25 Gram(s) IV Push once  enoxaparin Injectable 40 milliGRAM(s) SubCutaneous daily  fluconAZOLE   Tablet 100 milliGRAM(s) Oral once  insulin lispro (HumaLOG) corrective regimen sliding scale   SubCutaneous three times a day before meals  lisinopril 5 milliGRAM(s) Oral daily  metoprolol tartrate 25 milliGRAM(s) Oral two times a day  nystatin Cream 1 Application(s) Topical two times a day  pantoprazole    Tablet 40 milliGRAM(s) Oral before breakfast  piperacillin/tazobactam IVPB. 3.375 Gram(s) IV Intermittent every 8 hours    MEDICATIONS  (PRN):  dextrose 40% Gel 15 Gram(s) Oral once PRN Blood Glucose LESS THAN 70 milliGRAM(s)/deciliter  glucagon  Injectable 1 milliGRAM(s) IntraMuscular once PRN Glucose LESS THAN 70 milligrams/deciliter      Social History:    Lives with: daughter    Substance Use (street drugs): (X  ) never used  (  ) other:  Tobacco Usage:  ( X ) never smoked   (   ) former smoker   (   ) current smoker  (     ) pack year  (        ) last cigarette date  Alcohol Usage: none      Family History   IBD (  ) Yes   ( X ) No  GI Malignancy (  )  Yes    ( X ) No    Health Management  Last Colonoscopy - none      Advanced Directives: (   X  ) None    (      ) DNR    (     ) DNI    (     ) Health Care Proxy:     Review of Systems:    General:  no fever or chills  CV:  No pain, palpitations, hypo/hypertension  Resp:  No dyspnea, cough, tachypnea, wheezing  GI:  see HPI  :  no hematuria +incontinent  +groin area skin breakdown  Muscle:  +spinal injury s/p trauma +LE weakness   Neuro:  see HPI  Psych:  No history of depression or mood d/o  Endocrine:  No polyuria, polydypsia  Heme:  No petechiae, ecchymosis, easy bruisability  Skin:  +decubiti      Vital Signs Last 24 Hrs  T(C): 36.4 (2018 10:25), Max: 36.8 (2018 07:40)  T(F): 97.5 (2018 10:25), Max: 98.3 (2018 07:40)  HR: 95 (2018 10:25) (94 - 96)  BP: 151/79 (2018 10:25) (131/68 - 160/82)  BP(mean): --  RR: 18 (2018 10:25) (17 - 18)  SpO2: 95% (2018 10:25) (95% - 98%)    PHYSICAL EXAM:    Constitutional: chronically ill appearing female lying in bed, granddaughter at bedside  Neck: No LAD, supple  Respiratory: b/l air entry no wheeze  Cardiovascular: S1 and S2, RRR, no M  Gastrointestinal: BS+, soft, obese +multiple areas of denuded skin with erythema in inguinal areas and under pannus - tender to touch and friable no HSM, mass pulsations, no rebound guarding or rigidity negative Zaidi's sign  Extremities: +left ankle wound with dressing  +healed right heel wound +atrophy  Vascular: +palpable pulses, warm to touch  Neurological: A/O x 3, no focal asymmetry  Psychiatric: Normal mood, normal affect  Skin: see above - could not assess sacrum as pt c/o pain and not able to turn pt on stretcher (as per Medicine note, wound care to see pt)        LABS:                        9.8    10.6  )-----------( 526      ( 2018 10:05 )             30.7   MCV 82.9  Hemoglobin: 11.2 g/dL <L> [11.5 - 15.5] ( @ 03:11)        138  |  105  |  45<H>  ----------------------------<  123<H>  4.1   |  23  |  0.70    Ca    8.4      2018 10:05    TPro  6.6  /  Alb  2.8<L>  /  TBili  0.1<L>  /  DBili  <0.1  /  AST  12  /  ALT  13  /  AlkPhos  185<H>      PT/INR - ( 2018 03:11 )   PT: 11.3 sec;   INR: 1.04 ratio    PTT - ( 2018 03:11 )  PTT:34.9 sec    Urinalysis Basic - ( 2018 03:39 )    Color: Yellow / Appearance: Clear / S.025 / pH: x  Gluc: x / Ketone: Trace  / Bili: Negative / Urobili: Negative   Blood: x / Protein: 30 mg/dL / Nitrite: Negative   Leuk Esterase: Negative / RBC: x / WBC 3-5 /HPF   Sq Epi: x / Non Sq Epi: OCC /HPF / Bacteria: x              RADIOLOGY & ADDITIONAL TESTS:  < from: CT Abdomen and Pelvis w/ IV Cont (18 @ 06:21) >  INTERPRETATION:  CLINICAL INFORMATION: Status post fall. Back pain.    COMPARISON: None.    PROCEDURE:   CT of the Abdomen and Pelvis was performed with intravenous contrast.   Intravenous contrast: 90 ml Omnipaque 350. 10 ml discarded.  Oral contrast: None.  Sagittal and coronal reformats were performed.    FINDINGS:    LOWER CHEST: Calcific atherosclerosis of thoracic aorta.    LIVER: Within normal limits.  BILE DUCTS: Prominent CBD measures up to 8 to 9 mm in diameter.  GALLBLADDER: Distended.  SPLEEN: Within normal limits.  PANCREAS: Within normal limits.  ADRENALS: Within normal limits.    KIDNEYS/URETERS: Few subcentimeter hypodense structures, too small to   characterize. No hydronephrosis.  BLADDER: Bladder wall thickening due to underdistention versus cystitis.  REPRODUCTIVE ORGANS: The uterus and adnexa are within normal limits.    BOWEL: No bowel obstruction. Appendix within normal limits.  PERITONEUM: No free air..  VESSELS:  Calcific atherosclerosis of aorta. Multifocal irregular   atheromatous plaque, notably at the origin of the celiac axis indicating   ulcerated plaque.  RETROPERITONEUM: No lymphadenopathy.    ABDOMINAL WALL: Within normal limits.    BONES: Age-indeterminate moderate to severe compressive deformity of L1   vertebra with bony retropulsion. Multiple sclerotic foci likely reflect   bony islands in thevertebral bodies noted. Diffuse osteopenia and   multilevel spondylosis.  Sacral decubitus ulcer.    IMPRESSION:     Linear soft tissue defect extending to the sacrum, consistent with   history of sacral decubitus ulcer.    Age-indeterminate severe L1 compression fracture deformity with bony   retropulsion. Correlate with site of pain.    Ulcerated atheromatous plaque with notable ulceration at the origin of   the celiac axis.    Bladder wall thickening due to underdistention versus cystitis. Correlate   with urinalysis.    Mild CBD dilatation without evidence of choledocholithiasis. Correlate   with alkaline phosphatase/LFT's. Patient is a 67y old  Female who presents with a chief complaint of ftt (2018 10:40)    HPI:  68 y/o F pmhx htn, dm, brought in by daughter for concern for multiple falls, from wheel chair,  bed bound  for  more than  20 years,  and inability to care for herself.  Daughter reports patient lives in [place], and has a home health aide that is in the house with her throughout the day.. . Daughter reports that 2 weeks ago patient had a fall that was unwitnessed,  Daughter states that patient uses a wheelchair at baseline, . States that daughter went down to [place] to assess situation x2 days ago and noted that patient had ulcers to her buttocks, was complaining of back pain that was worse than her usual, and daughter was significantly concerned about her ability to care for self. Brought her up to [place] immediately.  Patient has a history of a traumatic injury in remote past consisting of falling out of a window; has had multiple surgeries, back injury causing some baseline loss of sensation in lower body, and is wheelchair bound at baseline. (2018 10:40)    denies BRBPR, melana, dairrhea Last BM 3 days ago  appetite "ok" no reported nausea or vomiting, no abdominal pain  no dysphagia or odynophagia  +multiple skin wounds - groin/ ankle/ sacrum  c/o sacral pain    PAST MEDICAL & SURGICAL HISTORY:  HTN   DM  decubiti  functional quadriplegia s/p trauma    Allergies  No Known Allergies    MEDICATIONS  (STANDING):  dextrose 5%. 1000 milliLiter(s) (50 mL/Hr) IV Continuous <Continuous>  dextrose 50% Injectable 12.5 Gram(s) IV Push once  dextrose 50% Injectable 25 Gram(s) IV Push once  dextrose 50% Injectable 25 Gram(s) IV Push once  enoxaparin Injectable 40 milliGRAM(s) SubCutaneous daily  fluconAZOLE   Tablet 100 milliGRAM(s) Oral once  insulin lispro (HumaLOG) corrective regimen sliding scale   SubCutaneous three times a day before meals  lisinopril 5 milliGRAM(s) Oral daily  metoprolol tartrate 25 milliGRAM(s) Oral two times a day  nystatin Cream 1 Application(s) Topical two times a day  pantoprazole    Tablet 40 milliGRAM(s) Oral before breakfast  piperacillin/tazobactam IVPB. 3.375 Gram(s) IV Intermittent every 8 hours    MEDICATIONS  (PRN):  dextrose 40% Gel 15 Gram(s) Oral once PRN Blood Glucose LESS THAN 70 milliGRAM(s)/deciliter  glucagon  Injectable 1 milliGRAM(s) IntraMuscular once PRN Glucose LESS THAN 70 milligrams/deciliter    Social History:  Lives with: daughter  Substance Use (street drugs): (X  ) never used  (  ) other:  Tobacco Usage:  ( X ) never smoked   (   ) former smoker   (   ) current smoker  (     ) pack year  (        ) last cigarette date  Alcohol Usage: none    Family History   IBD (  ) Yes   ( X ) No  GI Malignancy (  )  Yes    ( X ) No    Health Management  Last Colonoscopy - none    Advanced Directives: (   X  ) None    (      ) DNR    (     ) DNI    (     ) Health Care Proxy:     Review of Systems:  General:  no fever or chills  CV:  No pain, palpitations, hypo/hypertension  Resp:  No dyspnea, cough, tachypnea, wheezing  GI:  see HPI  :  no hematuria +incontinent  +groin area skin breakdown  Muscle:  +spinal injury s/p trauma +LE weakness   Neuro:  see HPI  Psych:  No history of depression or mood d/o  Endocrine:  No polyuria, polydypsia  Heme:  No petechiae, ecchymosis, easy bruisability  Skin:  +decubiti    Vital Signs Last 24 Hrs  T(C): 36.4 (2018 10:25), Max: 36.8 (2018 07:40)  T(F): 97.5 (2018 10:25), Max: 98.3 (2018 07:40)  HR: 95 (2018 10:25) (94 - 96)  BP: 151/79 (2018 10:25) (131/68 - 160/82)  BP(mean): --  RR: 18 (2018 10:25) (17 - 18)  SpO2: 95% (2018 10:25) (95% - 98%)    PHYSICAL EXAM:  Constitutional: chronically ill appearing female lying in bed, granddaughter at bedside  Neck: No LAD, supple  Respiratory: b/l air entry no wheeze  Cardiovascular: S1 and S2, RRR, no M  Gastrointestinal: BS+, soft, obese +multiple areas of denuded skin with erythema in inguinal areas and under pannus - tender to touch and friable no HSM, mass pulsations, no rebound guarding or rigidity negative Zaidi's sign  Extremities: +left ankle wound with dressing  +healed right heel wound +atrophy  Vascular: +palpable pulses, warm to touch  Neurological: A/O x 3, no focal asymmetry  Psychiatric: Normal mood, normal affect  Skin: see above - could not assess sacrum as pt c/o pain and not able to turn pt on stretcher    LABS:                      9.8    10.6  )-----------( 526      ( 2018 10:05 )             30.7   MCV 82.9  Hemoglobin: 11.2 g/dL <L> [11.5 - 15.5] ( @ 03:11)        138  |  105  |  45<H>  ----------------------------<  123<H>  4.1   |  23  |  0.70    Ca    8.4      2018 10:05    TPro  6.6  /  Alb  2.8<L>  /  TBili  0.1<L>  /  DBili  <0.1  /  AST  12  /  ALT  13  /  AlkPhos  185<H>      PT/INR - ( 2018 03:11 )   PT: 11.3 sec;   INR: 1.04 ratio    PTT - ( 2018 03:11 )  PTT:34.9 sec    Urinalysis Basic - ( 2018 03:39 )  Color: Yellow / Appearance: Clear / S.025 / pH: x  Gluc: x / Ketone: Trace  / Bili: Negative / Urobili: Negative   Blood: x / Protein: 30 mg/dL / Nitrite: Negative   Leuk Esterase: Negative / RBC: x / WBC 3-5 /HPF   Sq Epi: x / Non Sq Epi: OCC /HPF / Bacteria: x        RADIOLOGY & ADDITIONAL TESTS:  < from: CT Abdomen and Pelvis w/ IV Cont (18 @ 06:21) >  INTERPRETATION:  CLINICAL INFORMATION: Status post fall. Back pain.    COMPARISON: None.    PROCEDURE:   CT of the Abdomen and Pelvis was performed with intravenous contrast.   Intravenous contrast: 90 ml Omnipaque 350. 10 ml discarded.  Oral contrast: None.  Sagittal and coronal reformats were performed.    FINDINGS:    LOWER CHEST: Calcific atherosclerosis of thoracic aorta.    LIVER: Within normal limits.  BILE DUCTS: Prominent CBD measures up to 8 to 9 mm in diameter.  GALLBLADDER: Distended.  SPLEEN: Within normal limits.  PANCREAS: Within normal limits.  ADRENALS: Within normal limits.    KIDNEYS/URETERS: Few subcentimeter hypodense structures, too small to   characterize. No hydronephrosis.  BLADDER: Bladder wall thickening due to underdistention versus cystitis.  REPRODUCTIVE ORGANS: The uterus and adnexa are within normal limits.    BOWEL: No bowel obstruction. Appendix within normal limits.  PERITONEUM: No free air..  VESSELS:  Calcific atherosclerosis of aorta. Multifocal irregular   atheromatous plaque, notably at the origin of the celiac axis indicating   ulcerated plaque.  RETROPERITONEUM: No lymphadenopathy.    ABDOMINAL WALL: Within normal limits.    BONES: Age-indeterminate moderate to severe compressive deformity of L1   vertebra with bony retropulsion. Multiple sclerotic foci likely reflect   bony islands in thevertebral bodies noted. Diffuse osteopenia and   multilevel spondylosis.  Sacral decubitus ulcer.    IMPRESSION:     Linear soft tissue defect extending to the sacrum, consistent with   history of sacral decubitus ulcer.    Age-indeterminate severe L1 compression fracture deformity with bony   retropulsion. Correlate with site of pain.    Ulcerated atheromatous plaque with notable ulceration at the origin of   the celiac axis.    Bladder wall thickening due to underdistention versus cystitis. Correlate   with urinalysis.    Mild CBD dilatation without evidence of choledocholithiasis. Correlate   with alkaline phosphatase/LFT's.

## 2018-06-07 NOTE — ED ADULT NURSE NOTE - OBJECTIVE STATEMENT
67y female arrived to ED brought in by daughter for wounds s/p multiple falls for over 4 weeks. Per daughter, patient was living in [place], suffered multiple recent falls. Two weeks ago, patients aid had to leave patient unattended and patient fell. Patient was able to call family members for help but was not brought to a hospital. Patient has not been ambulatory for 10 years, but at baseline is able to standup with assistance, patient is no longer able to perform such tasks. Patients daughter explains that she went to [place] to assess the situation and found her mother suffering from multiple ulcers and back pain. Patients daughter shira her back to [place] for treatment and supervision. Patients daughter states that she did not realized the extent 67y female arrived to ED brought in by daughter for wounds s/p multiple falls for over 4 weeks. Per daughter, patient was living in [place], suffered multiple recent falls. Two weeks ago, patients aid had to leave patient unattended and patient fell. Patient was able to call family members for help but was not brought to a hospital. Patient has not been ambulatory for 10 years, but at baseline is able to standup with assistance, patient is no longer able to perform such tasks. Patients daughter explains that she went to [place] to assess the situation and found her mother suffering from multiple ulcers and back pain. Patients daughter brought her back to [place] for treatment and supervision. Patient A&Ox2 (person/place). Reports hitting her head when falling 2 weeks ago, complains of b/l lower back pain. Patient p/w redness/swelling on the left clavicle, left arm deformity from a break 10 years ago, chronic back pain from falling through a window years ago, b/l underbelly and groin erythema/ulcerations/sheering, b/l buttock sheering actively bleeding in ED, stage 3 pressure ulcer under right buttock, stage 3 pressure ulcer under left buttock, deformities to right ankle, b/l ankle swelling w/ weeping on the left ankle, unstageable ulcer on the left lateral heel. 67y female arrived to ED brought in by daughter for wounds s/p multiple falls for over 4 weeks. Per daughter, patient was living in [place], suffered multiple recent falls. Two weeks ago, patients aid had to leave patient unattended and patient fell. Patient was able to call family members for help but was not brought to a hospital. Patient has not been ambulatory for 10 years, but at baseline is able to standup with assistance, patient is no longer able to perform such tasks. Patients daughter explains that she went to [place] to assess the situation and found her mother suffering from multiple ulcers and back pain. Patients daughter brought her back to [place] for treatment and supervision. Patient A&Ox2 (person/place). Reports hitting her head when falling 2 weeks ago, complains of b/l lower back pain. Patient p/w redness/swelling on the left clavicle, left arm deformity from a break 10 years ago, chronic back pain from falling through a window years ago, b/l underbelly and groin erythema/ulcerations/sheering, b/l buttock sheering actively bleeding in ED (stage 2), un-stageable pressure ulcer under right buttock, un-stageable pressure ulcer under left buttock, deformities to right ankle, b/l ankle swelling w/ weeping on the left ankle, un-stageable ulcer on the left lateral heel.

## 2018-06-07 NOTE — H&P ADULT - HISTORY OF PRESENT ILLNESS
High Risk Travel:  International Travel? No(1)     67y Female complaining of wound check.	  : 66 y/o F pmhx htn, dm, brought in by daughter for concern for multiple falls and inability to care for herself.  Daughter reports patient lives in [place], and has a home health aide that is in the house with her throughout the day.. . Daughter reports that 2 weeks ago patient had a significant fall that was unwitnessed,  Daughter states that patient uses a wheelchair at baseline, but she performs her ADLs independently. States that daughter went down to [place] to assess situation x2 days ago and noted that patient had ulcers to her buttocks, was complaining of back pain that was worse than her usual, and daughter was significantly concerned about her ability to care for self. Brought her up to [place] immediately.  Patient has a history of a traumatic injury in remote past consisting of falling out of a window; has had multiple surgeries, back injury causing some baseline loss of sensation in lower body, and is wheelchair bound at baseline. High Risk Travel:  International Travel? No(1)     67y Female complaining of  decubiti.	  : 68 y/o F pmhx htn, dm, brought in by daughter for concern for multiple falls, from wheel chair,  bed bound  for  more than  20 years,  and inability to care for herself.  Daughter reports patient lives in [place], and has a home health aide that is in the house with her throughout the day.. . Daughter reports that 2 weeks ago patient had a t fall that was unwitnessed,  Daughter states that patient uses a wheelchair at baseline, . States that daughter went down to [place] to assess situation x2 days ago and noted that patient had ulcers to her buttocks, was complaining of back pain that was worse than her usual, and daughter was significantly concerned about her ability to care for self. Brought her up to [place] immediately.  Patient has a history of a traumatic injury in remote past consisting of falling out of a window; has had multiple surgeries, back injury causing some baseline loss of sensation in lower body, and is wheelchair bound at baseline.

## 2018-06-07 NOTE — ED PROVIDER NOTE - OBJECTIVE STATEMENT
68 y/o F pmhx htn, dm, brought in by daughter for concern for multiple falls and inability to care for herself. Daughter reports patient lives in [place], 2 days ago went down to see patient and noted that she was 68 y/o F pmhx htn, dm, brought in by daughter for concern for multiple falls and inability to care for herself. Daughter reports patient lives in [place], and has a home health aide that is in the house with her throughout the day. Reports that 4 weeks ago HHA had a family emergency and had to leave patient alone. Pt called other family members who were close by to come because she was alone, but it is unclear if patient remained alone. Daughter reports that 2 weeks ago patient had a significant fall that was unwitnessed, but it is unclear if patient remained on the ground or if someone helped patient up. Daughter states that patient uses a wheelchair at baseline, but she performs her ADLs independently. States that daughter went down to [place] to assess situation x2 days ago and noted that patient had ulcers to her buttocks, was complaining of back pain that was worse than her usual, and daughter was significantly concerned about her ability to care for self. Brought her up to [place] immediately. History is very unclear as patient is only oriented but confused and daughter was not present with patient prior to 2 days ago. 68 y/o F pmhx htn, dm, brought in by daughter for concern for multiple falls and inability to care for herself. Daughter reports patient lives in [place], and has a home health aide that is in the house with her throughout the day. Reports that 4 weeks ago HHA had a family emergency and had to leave patient alone. Pt called other family members who were close by to come because she was alone, but it is unclear if patient remained alone. Daughter reports that 2 weeks ago patient had a significant fall that was unwitnessed, but it is unclear if patient remained on the ground or if someone helped patient up. Daughter states that patient uses a wheelchair at baseline, but she performs her ADLs independently. States that daughter went down to [place] to assess situation x2 days ago and noted that patient had ulcers to her buttocks, was complaining of back pain that was worse than her usual, and daughter was significantly concerned about her ability to care for self. Brought her up to [place] immediately. Patient has a history of a traumatic injury in remote past consisting of falling out of a window; has had multiple surgeries, back injury causing some baseline loss of sensation in lower body, and is wheelchair bound at baseline. History is very unclear as patient is only oriented but confused and daughter was not present with patient prior to 2 days ago.

## 2018-06-08 LAB
ALBUMIN SERPL ELPH-MCNC: 2.6 G/DL — LOW (ref 3.3–5)
ALP SERPL-CCNC: 181 U/L — HIGH (ref 40–120)
ALT FLD-CCNC: 10 U/L — SIGNIFICANT CHANGE UP (ref 10–45)
ANION GAP SERPL CALC-SCNC: 8 MMOL/L — SIGNIFICANT CHANGE UP (ref 5–17)
AST SERPL-CCNC: 11 U/L — SIGNIFICANT CHANGE UP (ref 10–40)
BILIRUB DIRECT SERPL-MCNC: <0.1 MG/DL — SIGNIFICANT CHANGE UP (ref 0–0.2)
BILIRUB INDIRECT FLD-MCNC: >0 MG/DL — LOW (ref 0.2–1)
BILIRUB SERPL-MCNC: 0.1 MG/DL — LOW (ref 0.2–1.2)
BUN SERPL-MCNC: 36 MG/DL — HIGH (ref 7–23)
CALCIUM SERPL-MCNC: 9 MG/DL — SIGNIFICANT CHANGE UP (ref 8.4–10.5)
CHLORIDE SERPL-SCNC: 104 MMOL/L — SIGNIFICANT CHANGE UP (ref 96–108)
CO2 SERPL-SCNC: 26 MMOL/L — SIGNIFICANT CHANGE UP (ref 22–31)
CREAT SERPL-MCNC: 0.54 MG/DL — SIGNIFICANT CHANGE UP (ref 0.5–1.3)
FERRITIN SERPL-MCNC: 102 NG/ML — SIGNIFICANT CHANGE UP (ref 15–150)
FOLATE SERPL-MCNC: 6.2 NG/ML — SIGNIFICANT CHANGE UP
GGT SERPL-CCNC: 22 U/L — SIGNIFICANT CHANGE UP (ref 8–40)
GLUCOSE BLDC GLUCOMTR-MCNC: 104 MG/DL — HIGH (ref 70–99)
GLUCOSE BLDC GLUCOMTR-MCNC: 190 MG/DL — HIGH (ref 70–99)
GLUCOSE BLDC GLUCOMTR-MCNC: 201 MG/DL — HIGH (ref 70–99)
GLUCOSE BLDC GLUCOMTR-MCNC: 381 MG/DL — HIGH (ref 70–99)
GLUCOSE SERPL-MCNC: 122 MG/DL — HIGH (ref 70–99)
HCT VFR BLD CALC: 28.8 % — LOW (ref 34.5–45)
HGB BLD-MCNC: 8.9 G/DL — LOW (ref 11.5–15.5)
IRON SATN MFR SERPL: 35 UG/DL — SIGNIFICANT CHANGE UP (ref 30–160)
MCHC RBC-ENTMCNC: 25.5 PG — LOW (ref 27–34)
MCHC RBC-ENTMCNC: 30.9 GM/DL — LOW (ref 32–36)
MCV RBC AUTO: 82.5 FL — SIGNIFICANT CHANGE UP (ref 80–100)
PLATELET # BLD AUTO: 478 K/UL — HIGH (ref 150–400)
POTASSIUM SERPL-MCNC: 4 MMOL/L — SIGNIFICANT CHANGE UP (ref 3.5–5.3)
POTASSIUM SERPL-SCNC: 4 MMOL/L — SIGNIFICANT CHANGE UP (ref 3.5–5.3)
PROT SERPL-MCNC: 6.2 G/DL — SIGNIFICANT CHANGE UP (ref 6–8.3)
RBC # BLD: 3.49 M/UL — LOW (ref 3.8–5.2)
RBC # FLD: 19.2 % — HIGH (ref 10.3–14.5)
SODIUM SERPL-SCNC: 138 MMOL/L — SIGNIFICANT CHANGE UP (ref 135–145)
VIT B12 SERPL-MCNC: >2000 PG/ML — HIGH (ref 232–1245)
WBC # BLD: 9.2 K/UL — SIGNIFICANT CHANGE UP (ref 3.8–10.5)
WBC # FLD AUTO: 9.2 K/UL — SIGNIFICANT CHANGE UP (ref 3.8–10.5)

## 2018-06-08 PROCEDURE — 73610 X-RAY EXAM OF ANKLE: CPT | Mod: 26,LT

## 2018-06-08 PROCEDURE — 99222 1ST HOSP IP/OBS MODERATE 55: CPT

## 2018-06-08 PROCEDURE — 76700 US EXAM ABDOM COMPLETE: CPT | Mod: 26

## 2018-06-08 PROCEDURE — 93306 TTE W/DOPPLER COMPLETE: CPT | Mod: 26

## 2018-06-08 PROCEDURE — 73620 X-RAY EXAM OF FOOT: CPT | Mod: 26,LT

## 2018-06-08 PROCEDURE — 99253 IP/OBS CNSLTJ NEW/EST LOW 45: CPT

## 2018-06-08 RX ORDER — PANTOPRAZOLE SODIUM 20 MG/1
40 TABLET, DELAYED RELEASE ORAL
Qty: 0 | Refills: 0 | Status: DISCONTINUED | OUTPATIENT
Start: 2018-06-08 | End: 2018-06-11

## 2018-06-08 RX ORDER — MECLIZINE HCL 12.5 MG
25 TABLET ORAL DAILY
Qty: 0 | Refills: 0 | Status: DISCONTINUED | OUTPATIENT
Start: 2018-06-08 | End: 2018-06-11

## 2018-06-08 RX ORDER — MORPHINE SULFATE 50 MG/1
2 CAPSULE, EXTENDED RELEASE ORAL DAILY
Qty: 0 | Refills: 0 | Status: DISCONTINUED | OUTPATIENT
Start: 2018-06-08 | End: 2018-06-11

## 2018-06-08 RX ORDER — MORPHINE SULFATE 50 MG/1
2 CAPSULE, EXTENDED RELEASE ORAL DAILY
Qty: 0 | Refills: 0 | Status: DISCONTINUED | OUTPATIENT
Start: 2018-06-08 | End: 2018-06-08

## 2018-06-08 RX ORDER — ACETAMINOPHEN 500 MG
650 TABLET ORAL ONCE
Qty: 0 | Refills: 0 | Status: COMPLETED | OUTPATIENT
Start: 2018-06-08 | End: 2018-06-08

## 2018-06-08 RX ORDER — NYSTATIN CREAM 100000 [USP'U]/G
1 CREAM TOPICAL
Qty: 0 | Refills: 0 | Status: DISCONTINUED | OUTPATIENT
Start: 2018-06-08 | End: 2018-06-11

## 2018-06-08 RX ADMIN — Medication 25 MILLIGRAM(S): at 05:28

## 2018-06-08 RX ADMIN — NYSTATIN CREAM 1 APPLICATION(S): 100000 CREAM TOPICAL at 05:29

## 2018-06-08 RX ADMIN — Medication 1: at 18:21

## 2018-06-08 RX ADMIN — Medication 650 MILLIGRAM(S): at 20:06

## 2018-06-08 RX ADMIN — Medication 25 MILLIGRAM(S): at 20:06

## 2018-06-08 RX ADMIN — PIPERACILLIN AND TAZOBACTAM 25 GRAM(S): 4; .5 INJECTION, POWDER, LYOPHILIZED, FOR SOLUTION INTRAVENOUS at 13:09

## 2018-06-08 RX ADMIN — Medication 2: at 13:04

## 2018-06-08 RX ADMIN — ENOXAPARIN SODIUM 40 MILLIGRAM(S): 100 INJECTION SUBCUTANEOUS at 13:05

## 2018-06-08 RX ADMIN — NYSTATIN CREAM 1 APPLICATION(S): 100000 CREAM TOPICAL at 18:22

## 2018-06-08 RX ADMIN — PIPERACILLIN AND TAZOBACTAM 25 GRAM(S): 4; .5 INJECTION, POWDER, LYOPHILIZED, FOR SOLUTION INTRAVENOUS at 21:43

## 2018-06-08 RX ADMIN — LISINOPRIL 5 MILLIGRAM(S): 2.5 TABLET ORAL at 05:28

## 2018-06-08 RX ADMIN — Medication 3: at 22:43

## 2018-06-08 RX ADMIN — Medication 25 MILLIGRAM(S): at 18:22

## 2018-06-08 RX ADMIN — PIPERACILLIN AND TAZOBACTAM 25 GRAM(S): 4; .5 INJECTION, POWDER, LYOPHILIZED, FOR SOLUTION INTRAVENOUS at 05:29

## 2018-06-08 RX ADMIN — PANTOPRAZOLE SODIUM 40 MILLIGRAM(S): 20 TABLET, DELAYED RELEASE ORAL at 05:28

## 2018-06-08 NOTE — PROGRESS NOTE ADULT - ASSESSMENT
67 year old female with PMH HTN, DM, decubiti, functional quadriplegia s/p fall/trauma now admitted s/p fall from wheelchair, FTT, decubiti    Noted to have drop in Hgb (normocytic) and not clear what is pt's baseline Hgb. Currently without overt/brisk GI bleed at this time   - Monitor CBC and BMs  - Check iron indices    Distended GB with CBD dilation on CT, but clinically with benign abdominal exam and has been tolerating PO diet  - would check US  - trend LFTs  - add GGT (given extensive sacral ulcer with extension to the sacrum, elevated alk. phos may be secondary to bony involvement)    FTT, extensive wounds  - await wound care evaluation and input  - will add PO supplements to increase protein intake

## 2018-06-08 NOTE — PROGRESS NOTE ADULT - ASSESSMENT
67 year old female hx of trauma wheel chair bound functional quadraplegia presented with hx of multiple falls and FTT   reviewed her UA there were a few white cell noted but not significant to call it a possible uti, the patient did complain of dysuria.  She in ED not able to assess decubs given bed she was in, I wound recommend getting plastic surgery to perform assessment of ulcer and make recommendation for care of ulcer   Reviewed cxr and was read as clear lungs.   Examined the patient with the the wound care team the bedside and it appears she has diffuse excoriation of the buttocks of the skin likely related to moisture from urine or stool, she also has ulcer in sacral area   reviewed her cx are in progress   Plan  1. continue zosyn empiric for possible uti and  will also cover for decub ulcers  2.  wound care of buttocks sacral area as per the wound care team     supportive care as per medicine

## 2018-06-08 NOTE — PROGRESS NOTE ADULT - ASSESSMENT
pt    with h/o  htn,    dm  2, mlple falls, now  wheel chair bound.  beb bound, functional  quadriplegia  , s/p  h/o traumatic fall, from window, 20  yrs  ago   now  with ftt/ sacral  decubiti.  on zosyn   wound care  and ID  , saw   pt  pt  eval  anemia, gi  called  dvt ppx  med list  not available  from [place],  family  at  bedside now,  states she  takes  bp  meds/ cozaar   and  metformin  family also  wants placement  doppler  legs.  distal ulnar fx left  arm, ortho called,  sttates,  plan is  non operative  seen by wound  care    < from: Xray Shoulder 2 Views, Left (06.07.18 @ 04:51) >    IMPRESSION: There is chronic resorption of the humeral head with chronic   remodeling of the glenoid articular surface with adjacent osseous   productive change. This may be compatible with a neuropathic joint. There   is dislocation of the humeral shaft with respect to the glenoid. No acute   fracture is seen.    < from: Xray Wrist 3 Views, Left (06.07.18 @ 04:47) >    IMPRESSION:     Acute mildly displaced distal ulna fracture.      < from: Xray Elbow AP + Lateral, Left (06.07.18 @ 04:47) >  COMPARISON: None.    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 There is no obvious   acute fracture identified. There is mild deformity and cortical   thickening at the mid shaft of the ulna related to healing of a prior   fracture.            < end of copied text >

## 2018-06-08 NOTE — CONSULT NOTE ADULT - SUBJECTIVE AND OBJECTIVE BOX
GENERAL SURGERY CONSULT NOTE  --------------------------------------------------------------------------------------------    Patient is a 67y old  Female who presents with a chief complaint of failure to thrive.      HPI: 66 yo female with h/o spinal injury 20 years ago s/p multiple surgeries, currently wheelchair-bound, who lived in [place] until recently her daughter brought her to [place] after patient had had multiple falls from wheelchair and developed pressure wounds on buttocks. Patient was brought to ED for failure to thrive and was found to have multiple chronic fractures and malnutrition, so was admitted for treatment of all of these issues. She had described worsening of chronic back pain and underwent computed tomography of her abdomen with IV contrast which showed atheromatous plaque at origin of celiac axis, with ulceration. Vascular called for management of this finding.  Patient denies postprandial abdominal pain besides her acid reflux pain after spicy food, for which she is on protonix. She denies food fear.    ROS: 10-system review is otherwise negative except HPI above.      PAST MEDICAL & SURGICAL HISTORY:  HTN   DM  decubiti  functional quadriplegia s/p trauma    SOCIAL HISTORY: Does not smoke or drink EtOH, no other substance use.    ALLERGIES: No Known Allergies    HOME MEDICATIONS: patient reports that she takes Aspirin prn for aches/pains and that she is on a statin.    CURRENT MEDICATIONS  MEDICATIONS (STANDING): dextrose 5%. 1000 milliLiter(s) IV Continuous <Continuous>  dextrose 50% Injectable 12.5 Gram(s) IV Push once  dextrose 50% Injectable 25 Gram(s) IV Push once  dextrose 50% Injectable 25 Gram(s) IV Push once  enoxaparin Injectable 40 milliGRAM(s) SubCutaneous daily  insulin lispro (HumaLOG) corrective regimen sliding scale   SubCutaneous three times a day before meals  insulin lispro (HumaLOG) corrective regimen sliding scale   SubCutaneous at bedtime  lisinopril 5 milliGRAM(s) Oral daily  metoprolol tartrate 25 milliGRAM(s) Oral two times a day  pantoprazole    Tablet 40 milliGRAM(s) Oral before breakfast  piperacillin/tazobactam IVPB. 3.375 Gram(s) IV Intermittent every 8 hours    MEDICATIONS (PRN):dextrose 40% Gel 15 Gram(s) Oral once PRN Blood Glucose LESS THAN 70 milliGRAM(s)/deciliter  glucagon  Injectable 1 milliGRAM(s) IntraMuscular once PRN Glucose LESS THAN 70 milligrams/deciliter  morphine  - Injectable 2 milliGRAM(s) IV Push daily PRN Moderate Pain (4 - 6)    --------------------------------------------------------------------------------------------    Vitals:   T(C): 36.5 (18 @ 11:51), Max: 36.8 (18 @ 17:54)  HR: 88 (18 @ 11:51) (71 - 107)  BP: 112/55 (18 @ 11:51) (112/55 - 128/81)  BP(mean): --  RR: 18 (18 @ 11:51) (18 - 18)  SpO2: 97% (18 @ 11:51) (95% - 98%)  Wt(kg): --  CAPILLARY BLOOD GLUCOSE    POCT Blood Glucose.: 201 mg/dL (2018 12:17)  POCT Blood Glucose.: 104 mg/dL (2018 08:15)  POCT Blood Glucose.: 251 mg/dL (2018 22:26)  POCT Blood Glucose.: 176 mg/dL (2018 17:43)    CAPILLARY BLOOD GLUCOSE    POCT Blood Glucose.: 201 mg/dL (2018 12:17)  POCT Blood Glucose.: 104 mg/dL (2018 08:15)  POCT Blood Glucose.: 251 mg/dL (2018 22:26)  POCT Blood Glucose.: 176 mg/dL (2018 17:43)    06-07 @ 07:01  -   @ 07:00  --------------------------------------------------------  IN:    IV PiggyBack: 200 mL    Oral Fluid: 240 mL  Total IN: 440 mL    OUT:  Total OUT: 0 mL    Total NET: 440 mL     @ 07:01  -   @ 16:34  --------------------------------------------------------  IN:    Oral Fluid: 480 mL  Total IN: 480 mL    OUT:  Total OUT: 0 mL    Total NET: 480 mL    Height (cm): 152.4 ( @ 17:54)  Weight (kg): 70 ( @ 17:54)  BMI (kg/m2): 30.1 ( 17:54)  BSA (m2): 1.67 ( 17:54)    PHYSICAL EXAM:  NAD, A+Ox3  Non labored respirations  Abdomen is soft, non-tender, non-distended  Lower extremities are contracted, cool, with femoral and popliteal pulses. Left foot wound is wrapped  Upper extremities with radial/ulnar pulses. Left upper extremity contracted/deformed  --------------------------------------------------------------------------------------------    LABS  CBC ( @ 09:27)                              8.9<L>                         9.20    )----------------(  478<H>     --    % Neutrophils, --    % Lymphocytes, ANC: --                                  28.8<L>  CBC ( @ 10:05)                              9.8<L>                         10.6<H>  )----------------(  526<H>     --    % Neutrophils, --    % Lymphocytes, ANC: --                                  30.7<L>    BMP ( @ 07:10)             138     |  104     |  36<H> 		Ca++ --      Ca 9.0                ---------------------------------( 122<H>		Mg --                 4.0     |  26      |  0.54  			Ph --      BMP ( @ 10:05)             138     |  105     |  45<H> 		Ca++ --      Ca 8.4                ---------------------------------( 123<H>		Mg --                 4.1     |  23      |  0.70  			Ph --        LFTs ( @ 07:10)      TPro 6.2 / Alb 2.6<L> / TBili 0.1<L> / DBili <0.1 / AST 11 / ALT 10 / AlkPhos 181<H>  LFTs ( @ 10:05)      TPro 6.6 / Alb 2.8<L> / TBili 0.1<L> / DBili <0.1 / AST 12 / ALT 13 / AlkPhos 185<H>    --------------------------------------------------------------------------------------------    MICROBIOLOGY  Urinalysis ( @ 03:39):     Color: Yellow / Appearance: Clear / S.025 / pH: 5.5 / Gluc: Negative / Ketones: Trace<!> / Bili: Negative / Urobili: Negative / Protein :30<!> / Nitrites: Negative / Leuk.Est: Negative / RBC:  / WBC: 3-5 / Sq Epi:  / Non Sq Epi: OCC / Bacteria    Few Mucus Strands    --------------------------------------------------------------------------------------------    IMAGING  < from: CT Abdomen and Pelvis w/ IV Cont (18 @ 06:21) >  Linear soft tissue defect extending to the sacrum, consistent with   history of sacral decubitus ulcer.    Age-indeterminate severe L1 compression fracture deformity with bony   retropulsion. Correlate with site of pain.    Ulcerated atheromatous plaque with notable ulceration at the origin of   the celiac axis.    Bladder wall thickening due to underdistention versus cystitis. Correlate   with urinalysis.    Mild CBD dilatation without evidence of choledocholithiasis. Correlate   with alkaline phosphatase/LFT's.  < end of copied text >    ASSESSMENT: Patient is a 67 year old female with recent multiple falls and failure to thrive found to have ulcerating plaque in proximal celiac artery. No signs or symptoms concerning for chronic mesenteric ischemia. Recommend aspirin and statin, which patient is already on. No vascular surgery intervention indicated at this time.     D/w Attending.    Christine, PGY3 GENERAL SURGERY CONSULT NOTE  --------------------------------------------------------------------------------------------    Patient is a 67y old  Female who presents with a chief complaint of failure to thrive.      HPI: 68 yo female with h/o spinal injury 20 years ago s/p multiple surgeries, currently wheelchair-bound, who lived in [place] until recently her daughter brought her to [place] after patient had had multiple falls from wheelchair and developed pressure wounds on buttocks. Patient was brought to ED for failure to thrive and was found to have multiple chronic fractures and malnutrition, so was admitted for treatment of all of these issues. She had described worsening of chronic back pain and underwent computed tomography of her abdomen with IV contrast which showed atheromatous plaque at origin of celiac axis, with ulceration. Vascular called for management of this finding.  Patient denies postprandial abdominal pain besides her acid reflux pain after spicy food, for which she is on protonix. She denies food fear.    ROS: 10-system review is otherwise negative except HPI above.      PAST MEDICAL & SURGICAL HISTORY:  HTN   DM  decubiti  functional quadriplegia s/p trauma    SOCIAL HISTORY: Does not smoke or drink EtOH, no other substance use.    ALLERGIES: No Known Allergies    HOME MEDICATIONS: patient reports that she takes Aspirin prn for aches/pains and that she is on a statin.    CURRENT MEDICATIONS  MEDICATIONS (STANDING): dextrose 5%. 1000 milliLiter(s) IV Continuous <Continuous>  dextrose 50% Injectable 12.5 Gram(s) IV Push once  dextrose 50% Injectable 25 Gram(s) IV Push once  dextrose 50% Injectable 25 Gram(s) IV Push once  enoxaparin Injectable 40 milliGRAM(s) SubCutaneous daily  insulin lispro (HumaLOG) corrective regimen sliding scale   SubCutaneous three times a day before meals  insulin lispro (HumaLOG) corrective regimen sliding scale   SubCutaneous at bedtime  lisinopril 5 milliGRAM(s) Oral daily  metoprolol tartrate 25 milliGRAM(s) Oral two times a day  pantoprazole    Tablet 40 milliGRAM(s) Oral before breakfast  piperacillin/tazobactam IVPB. 3.375 Gram(s) IV Intermittent every 8 hours    MEDICATIONS (PRN):dextrose 40% Gel 15 Gram(s) Oral once PRN Blood Glucose LESS THAN 70 milliGRAM(s)/deciliter  glucagon  Injectable 1 milliGRAM(s) IntraMuscular once PRN Glucose LESS THAN 70 milligrams/deciliter  morphine  - Injectable 2 milliGRAM(s) IV Push daily PRN Moderate Pain (4 - 6)    --------------------------------------------------------------------------------------------    Vitals:   T(C): 36.5 (18 @ 11:51), Max: 36.8 (18 @ 17:54)  HR: 88 (18 @ 11:51) (71 - 107)  BP: 112/55 (18 @ 11:51) (112/55 - 128/81)  BP(mean): --  RR: 18 (18 @ 11:51) (18 - 18)  SpO2: 97% (18 @ 11:51) (95% - 98%)  Wt(kg): --  CAPILLARY BLOOD GLUCOSE    POCT Blood Glucose.: 201 mg/dL (2018 12:17)  POCT Blood Glucose.: 104 mg/dL (2018 08:15)  POCT Blood Glucose.: 251 mg/dL (2018 22:26)  POCT Blood Glucose.: 176 mg/dL (2018 17:43)    CAPILLARY BLOOD GLUCOSE    POCT Blood Glucose.: 201 mg/dL (2018 12:17)  POCT Blood Glucose.: 104 mg/dL (2018 08:15)  POCT Blood Glucose.: 251 mg/dL (2018 22:26)  POCT Blood Glucose.: 176 mg/dL (2018 17:43)    06-07 @ 07:01  -   @ 07:00  --------------------------------------------------------  IN:    IV PiggyBack: 200 mL    Oral Fluid: 240 mL  Total IN: 440 mL    OUT:  Total OUT: 0 mL    Total NET: 440 mL     @ 07:01  -   @ 16:34  --------------------------------------------------------  IN:    Oral Fluid: 480 mL  Total IN: 480 mL    OUT:  Total OUT: 0 mL    Total NET: 480 mL    Height (cm): 152.4 ( @ 17:54)  Weight (kg): 70 ( @ 17:54)  BMI (kg/m2): 30.1 ( 17:54)  BSA (m2): 1.67 ( 17:54)    PHYSICAL EXAM:  NAD, A+Ox3  Non labored respirations  Abdomen is soft, non-tender, non-distended  Lower extremities are contracted, cool, with femoral and popliteal pulses. Left foot wound is wrapped  Upper extremities with radial/ulnar pulses. Left upper extremity contracted/deformed  --------------------------------------------------------------------------------------------    LABS  CBC ( @ 09:27)                              8.9<L>                         9.20    )----------------(  478<H>     --    % Neutrophils, --    % Lymphocytes, ANC: --                                  28.8<L>  CBC ( @ 10:05)                              9.8<L>                         10.6<H>  )----------------(  526<H>     --    % Neutrophils, --    % Lymphocytes, ANC: --                                  30.7<L>    BMP ( @ 07:10)             138     |  104     |  36<H> 		Ca++ --      Ca 9.0                ---------------------------------( 122<H>		Mg --                 4.0     |  26      |  0.54  			Ph --      BMP ( @ 10:05)             138     |  105     |  45<H> 		Ca++ --      Ca 8.4                ---------------------------------( 123<H>		Mg --                 4.1     |  23      |  0.70  			Ph --        LFTs ( @ 07:10)      TPro 6.2 / Alb 2.6<L> / TBili 0.1<L> / DBili <0.1 / AST 11 / ALT 10 / AlkPhos 181<H>  LFTs ( @ 10:05)      TPro 6.6 / Alb 2.8<L> / TBili 0.1<L> / DBili <0.1 / AST 12 / ALT 13 / AlkPhos 185<H>    --------------------------------------------------------------------------------------------    MICROBIOLOGY  Urinalysis ( @ 03:39):     Color: Yellow / Appearance: Clear / S.025 / pH: 5.5 / Gluc: Negative / Ketones: Trace<!> / Bili: Negative / Urobili: Negative / Protein :30<!> / Nitrites: Negative / Leuk.Est: Negative / RBC:  / WBC: 3-5 / Sq Epi:  / Non Sq Epi: OCC / Bacteria    Few Mucus Strands    --------------------------------------------------------------------------------------------    IMAGING  < from: CT Abdomen and Pelvis w/ IV Cont (18 @ 06:21) >  Linear soft tissue defect extending to the sacrum, consistent with   history of sacral decubitus ulcer.    Age-indeterminate severe L1 compression fracture deformity with bony   retropulsion. Correlate with site of pain.    Ulcerated atheromatous plaque with notable ulceration at the origin of   the celiac axis.    Bladder wall thickening due to underdistention versus cystitis. Correlate   with urinalysis.    Mild CBD dilatation without evidence of choledocholithiasis. Correlate   with alkaline phosphatase/LFT's.  < end of copied text >

## 2018-06-08 NOTE — CONSULT NOTE ADULT - SUBJECTIVE AND OBJECTIVE BOX
Wound SURGERY CONSULT NOTE    HPI:  68 y/o F pmhx htn, dm, brought in by daughter for concern for multiple falls, from wheel chair,  bed bound  for  more than  20 years,  and inability to care for herself.  Daughter reports patient lives in [place], and has a home health aide that is in the house with her throughout the day.  Daughter reports that 2 weeks ago patient had a t fall that was unwitnessed,  Daughter states that patient uses a wheelchair at baseline. States that daughter went down to [place] to assess situation x2 days ago and noted that patient had ulcers to her buttocks, was complaining of back pain that was worse than her usual, and daughter was significantly concerned about her ability to care for self. Brought her up to [place] immediately.  Patient has a history of a traumatic injury in remote past consisting of falling out of a window; has had multiple surgeries, back injury causing some baseline loss of sensation in lower body, and is wheelchair bound at baseline.  (+)incontinent  Wound consult called to assist w/ management of buttock/ sacral wounds and ankle wound.  (+)pain & redness noted.  offloading & pericare initiated & ALLEVYN foams placed awaiting consult.  No f/c/s. no warmth, odor noted.    PAST MEDICAL & SURGICAL HISTORY:  DM  HTN  S/p spinal surgeries    REVIEW OF SYSTEMS    Skin/ MSK: see HPI  All other systems negative    MEDICATIONS  (STANDING):  dextrose 5%. 1000 milliLiter(s) (50 mL/Hr) IV Continuous <Continuous>  dextrose 50% Injectable 12.5 Gram(s) IV Push once  dextrose 50% Injectable 25 Gram(s) IV Push once  dextrose 50% Injectable 25 Gram(s) IV Push once  enoxaparin Injectable 40 milliGRAM(s) SubCutaneous daily  insulin lispro (HumaLOG) corrective regimen sliding scale   SubCutaneous three times a day before meals  insulin lispro (HumaLOG) corrective regimen sliding scale   SubCutaneous at bedtime  lisinopril 5 milliGRAM(s) Oral daily  metoprolol tartrate 25 milliGRAM(s) Oral two times a day  nystatin Cream 1 Application(s) Topical two times a day  pantoprazole    Tablet 40 milliGRAM(s) Oral before breakfast  piperacillin/tazobactam IVPB. 3.375 Gram(s) IV Intermittent every 8 hours    MEDICATIONS  (PRN):  dextrose 40% Gel 15 Gram(s) Oral once PRN Blood Glucose LESS THAN 70 milliGRAM(s)/deciliter  glucagon  Injectable 1 milliGRAM(s) IntraMuscular once PRN Glucose LESS THAN 70 milligrams/deciliter  morphine  - Injectable 2 milliGRAM(s) IV Push daily PRN Moderate Pain (4 - 6)      No Known Allergies      SOCIAL HISTORY:  no lives with daughter;  Denies smoking, ETOH, drugs    FAMILY HISTORY: non significant      Vital Signs Last 24 Hrs  T(C): 36.5 (2018 11:51), Max: 36.8 (2018 17:54)  T(F): 97.7 (2018 11:51), Max: 98.3 (2018 17:54)  HR: 88 (2018 11:51) (71 - 107)  BP: 112/55 (2018 11:51) (112/55 - 128/81)  BP(mean): --  RR: 18 (2018 11:51) (18 - 18)  SpO2: 97% (2018 11:51) (95% - 98%)    NAD /  A&Ox3  Obese/ frail/ Disheveled  Versa Care P500 bed    Cardiovascular: RRR     Respiratory: CTA    Gastrointestinal soft NT/ND (+)BS     Neurology BUE weakened  strength, BLE paraplegia & sensation grossly intact    Musculoskeletal/Vascular: FROM x4  BLE edema equal  (+) DP/PT pulses palpable  BLE equally warm  no acute ischemia noted    Lt lateral malleolus w/ 2.5cm x 1.5cm 0.5cm stage 4 pressure ulcer  (+)fibrinous exudate & granular tissue  (+) serosanguinous drainage   (+) exposed bone  No odor,  increased warmth, nontender, erythema, induration, fluctuance      Skin:  dry &frail,  ecchymosis w/o hematoma    Anterior thighs/ perineum / groin folds/ abdominal pannus/ inframammary folds w/ moisture dermatitis w/o skin loss mildly tender w/o drainage    Bilateral buttocks and posterior thighs excoriated w/ blanchable erythematous   w/ multiple areas of partial thickness skin loss w/ fibrinous exudate & moist granular tissue  Rt ischium moist & granular stage 3 pressure ulcer 2.5cm x 4.5cm x 0.7cm  (+) serosanguinous drainage & tender    No odor,  increased warmth, induration, fluctuance    LABS:      138  |  104  |  36<H>  ----------------------------<  122<H>  4.0   |  26  |  0.54    Ca    9.0      2018 07:10    TPro  6.2  /  Alb  2.6<L>  /  TBili  0.1<L>  /  DBili  <0.1  /  AST  11  /  ALT  10  /  AlkPhos  181<H>                            8.9    9.20  )-----------( 478      ( 2018 09:27 )             28.8     PT/INR - ( 2018 03:11 )   PT: 11.3 sec;   INR: 1.04 ratio     PTT - ( 2018 03:11 )  PTT:34.9 sec    Urinalysis Basic - ( 2018 03:39 )    Color: Yellow / Appearance: Clear / S.025 / pH: x  Gluc: x / Ketone: Trace  / Bili: Negative / Urobili: Negative   Blood: x / Protein: 30 mg/dL / Nitrite: Negative   Leuk Esterase: Negative / RBC: x / WBC 3-5 /HPF   Sq Epi: x / Non Sq Epi: OCC /HPF / Bacteria: x        RADIOLOGY & ADDITIONAL STUDIES:    < from: VA Duplex Lower Ext Vein Scan, Bilat (18 @ 16:49) >  FINDINGS:    There is normal compressibility of the bilateral common femoral, femoral   and popliteal veins. No calf vein thrombosis is detected.    Doppler examination shows normal spontaneous and phasic flow.    There is bilateral calf edema.    IMPRESSION:     No evidence of bilateral lower extremity deep venous thrombosis.    < from: CT Abdomen and Pelvis w/ IV Cont (18 @ 06:21) >  FINDINGS:    LOWER CHEST: Calcific atherosclerosis of thoracic aorta.    LIVER: Within normal limits.  BILE DUCTS: Prominent CBD measures up to 8 to 9 mm in diameter.  GALLBLADDER: Distended.  SPLEEN: Within normal limits.  PANCREAS: Within normal limits.  ADRENALS: Within normal limits.    KIDNEYS/URETERS: Few subcentimeter hypodense structures, too small to   characterize. No hydronephrosis.  BLADDER: Bladder wall thickening due to underdistention versus cystitis.  REPRODUCTIVE ORGANS: The uterus and adnexa are within normal limits.    BOWEL: No bowel obstruction. Appendix within normal limits.  PERITONEUM: No free air..  VESSELS:  Calcific atherosclerosis of aorta. Multifocal irregular   atheromatous plaque, notably at the origin of the celiac axis indicating   ulcerated plaque.  RETROPERITONEUM: No lymphadenopathy.    ABDOMINAL WALL: Within normal limits.    BONES: Age-indeterminate moderate to severe compressive deformity of L1   vertebra with bony retropulsion. Multiple sclerotic foci likely reflect   bony islands in thevertebral bodies noted. Diffuse osteopenia and   multilevel spondylosis.  Sacral decubitus ulcer.    IMPRESSION:     Linear soft tissue defect extending to the sacrum, consistent with   history of sacral decubitus ulcer.    Age-indeterminate severe L1 compression fracture deformity with bony   retropulsion. Correlate with site of pain.    Ulcerated atheromatous plaque with notable ulceration at the origin of   the celiac axis.    Bladder wall thickening due to underdistention versus cystitis. Correlate   with urinalysis.    Mild CBD dilatation without evidence of choledocholithiasis. Correlate   with alkaline phosphatase/LFT's.    < from: CT Lumbar Spine Reform w/ IV Cont (18 @ 06:21) >  FINDINGS:    LOWER CHEST: Calcific atherosclerosis of thoracic aorta.    LIVER: Within normal limits.  BILE DUCTS: Prominent CBD measures up to 8 to 9 mm in diameter.  GALLBLADDER: Distended.  SPLEEN: Within normal limits.  PANCREAS: Within normal limits.  ADRENALS: Within normal limits.    KIDNEYS/URETERS: Few subcentimeter hypodense structures, too small to   characterize. No hydronephrosis.  BLADDER: Bladder wall thickening due to underdistention versus cystitis.  REPRODUCTIVE ORGANS: The uterus and adnexa are within normal limits.    BOWEL: No bowel obstruction. Appendix within normal limits.  PERITONEUM: No free air..  VESSELS:  Calcific atherosclerosis of aorta. Multifocal irregular   atheromatous plaque, notably at the origin of the celiac axis indicating   ulcerated plaque.  RETROPERITONEUM: No lymphadenopathy.    ABDOMINAL WALL: Within normal limits.    BONES: Age-indeterminate moderate to severe compressive deformity of L1   vertebra with bony retropulsion. Multiple sclerotic foci likely reflect   bony islands in thevertebral bodies noted. Diffuse osteopenia and   multilevel spondylosis.  Sacral decubitus ulcer.    IMPRESSION:     Linear soft tissue defect extending to the sacrum, consistent with   history of sacral decubitus ulcer.    Age-indeterminate severe L1 compression fracture deformity with bony   retropulsion. Correlate with site of pain.    Ulcerated atheromatous plaque with notable ulceration at the origin of   the celiac axis.    Bladder wall thickening due to underdistention versus cystitis. Correlate   with urinalysis.    Mild CBD dilatation without evidence of choledocholithiasis. Correlate   with alkaline phosphatase/LFT's. Wound SURGERY CONSULT NOTE    HPI:  68 y/o F pmhx htn, dm, brought in by daughter for concern for multiple falls, from wheel chair,  bed bound  for  more than  20 years,  and inability to care for herself.  Daughter reports patient lives in [place], and has a home health aide that is in the house with her throughout the day.  Daughter reports that 2 weeks ago patient had a t fall that was unwitnessed,  Daughter states that patient uses a wheelchair at baseline. States that daughter went down to [place] to assess situation x2 days ago and noted that patient had ulcers to her buttocks, was complaining of back pain that was worse than her usual, and daughter was significantly concerned about her ability to care for self. Brought her up to [place] immediately.  Patient has a history of a traumatic injury in remote past consisting of falling out of a window; has had multiple surgeries, back injury causing some baseline loss of sensation in lower body, and is wheelchair bound at baseline.  (+)incontinent  Wound consult called to assist w/ management of buttock/ sacral wounds and ankle wound.  (+)pain & redness noted.  offloading & pericare initiated & ALLEVYN foams placed awaiting consult.  No f/c/s. no warmth, odor noted.  All questions asked & answered to patients satisfactions.    PAST MEDICAL & SURGICAL HISTORY:  DM  HTN  S/p spinal surgeries    REVIEW OF SYSTEMS    Skin/ MSK: see HPI  All other systems negative    MEDICATIONS  (STANDING):  dextrose 5%. 1000 milliLiter(s) (50 mL/Hr) IV Continuous <Continuous>  dextrose 50% Injectable 12.5 Gram(s) IV Push once  dextrose 50% Injectable 25 Gram(s) IV Push once  dextrose 50% Injectable 25 Gram(s) IV Push once  enoxaparin Injectable 40 milliGRAM(s) SubCutaneous daily  insulin lispro (HumaLOG) corrective regimen sliding scale   SubCutaneous three times a day before meals  insulin lispro (HumaLOG) corrective regimen sliding scale   SubCutaneous at bedtime  lisinopril 5 milliGRAM(s) Oral daily  metoprolol tartrate 25 milliGRAM(s) Oral two times a day  nystatin Cream 1 Application(s) Topical two times a day  pantoprazole    Tablet 40 milliGRAM(s) Oral before breakfast  piperacillin/tazobactam IVPB. 3.375 Gram(s) IV Intermittent every 8 hours    MEDICATIONS  (PRN):  dextrose 40% Gel 15 Gram(s) Oral once PRN Blood Glucose LESS THAN 70 milliGRAM(s)/deciliter  glucagon  Injectable 1 milliGRAM(s) IntraMuscular once PRN Glucose LESS THAN 70 milligrams/deciliter  morphine  - Injectable 2 milliGRAM(s) IV Push daily PRN Moderate Pain (4 - 6)      No Known Allergies      SOCIAL HISTORY:  no lives with daughter;  Denies smoking, ETOH, drugs    FAMILY HISTORY: non significant      Vital Signs Last 24 Hrs  T(C): 36.5 (2018 11:51), Max: 36.8 (2018 17:54)  T(F): 97.7 (2018 11:51), Max: 98.3 (2018 17:54)  HR: 88 (2018 11:51) (71 - 107)  BP: 112/55 (2018 11:51) (112/55 - 128/81)  BP(mean): --  RR: 18 (2018 11:51) (18 - 18)  SpO2: 97% (2018 11:51) (95% - 98%)    NAD /  A&Ox3  Obese/ frail/ Disheveled  Versa Care P500 bed    Cardiovascular: RRR     Respiratory: CTA    Gastrointestinal soft NT/ND (+)BS     Neurology BUE weakened  strength, BLE paraplegia & sensation grossly intact    Musculoskeletal/Vascular: FROM x4  BLE edema equal  (+) DP/PT pulses palpable  BLE equally warm  no acute ischemia noted    Lt lateral malleolus w/ 2.5cm x 1.5cm 0.5cm stage 4 pressure ulcer  (+)fibrinous exudate & granular tissue  (+) serosanguinous drainage   (+) exposed bone  No odor,  increased warmth, nontender, erythema, induration, fluctuance      Skin:  dry &frail,  ecchymosis w/o hematoma    Anterior thighs/ perineum / groin folds/ abdominal pannus/ inframammary folds w/ moisture dermatitis w/o skin loss mildly tender w/o drainage    Bilateral buttocks and posterior thighs excoriated w/ blanchable erythematous   w/ multiple areas of partial thickness skin loss w/ fibrinous exudate & moist granular tissue  Rt ischium moist & granular stage 3 pressure ulcer 2.5cm x 4.5cm x 0.7cm  (+) serosanguinous drainage & tender    No odor,  increased warmth, induration, fluctuance    LABS:      138  |  104  |  36<H>  ----------------------------<  122<H>  4.0   |  26  |  0.54    Ca    9.0      2018 07:10    TPro  6.2  /  Alb  2.6<L>  /  TBili  0.1<L>  /  DBili  <0.1  /  AST  11  /  ALT  10  /  AlkPhos  181<H>                            8.9    9.20  )-----------( 478      ( 2018 09:27 )             28.8     PT/INR - ( 2018 03:11 )   PT: 11.3 sec;   INR: 1.04 ratio     PTT - ( 2018 03:11 )  PTT:34.9 sec    Urinalysis Basic - ( 2018 03:39 )    Color: Yellow / Appearance: Clear / S.025 / pH: x  Gluc: x / Ketone: Trace  / Bili: Negative / Urobili: Negative   Blood: x / Protein: 30 mg/dL / Nitrite: Negative   Leuk Esterase: Negative / RBC: x / WBC 3-5 /HPF   Sq Epi: x / Non Sq Epi: OCC /HPF / Bacteria: x        RADIOLOGY & ADDITIONAL STUDIES:    < from: VA Duplex Lower Ext Vein Scan, Bilat (18 @ 16:49) >  FINDINGS:    There is normal compressibility of the bilateral common femoral, femoral   and popliteal veins. No calf vein thrombosis is detected.    Doppler examination shows normal spontaneous and phasic flow.    There is bilateral calf edema.    IMPRESSION:     No evidence of bilateral lower extremity deep venous thrombosis.    < from: CT Abdomen and Pelvis w/ IV Cont (18 @ 06:21) >  FINDINGS:    LOWER CHEST: Calcific atherosclerosis of thoracic aorta.    LIVER: Within normal limits.  BILE DUCTS: Prominent CBD measures up to 8 to 9 mm in diameter.  GALLBLADDER: Distended.  SPLEEN: Within normal limits.  PANCREAS: Within normal limits.  ADRENALS: Within normal limits.    KIDNEYS/URETERS: Few subcentimeter hypodense structures, too small to   characterize. No hydronephrosis.  BLADDER: Bladder wall thickening due to underdistention versus cystitis.  REPRODUCTIVE ORGANS: The uterus and adnexa are within normal limits.    BOWEL: No bowel obstruction. Appendix within normal limits.  PERITONEUM: No free air..  VESSELS:  Calcific atherosclerosis of aorta. Multifocal irregular   atheromatous plaque, notably at the origin of the celiac axis indicating   ulcerated plaque.  RETROPERITONEUM: No lymphadenopathy.    ABDOMINAL WALL: Within normal limits.    BONES: Age-indeterminate moderate to severe compressive deformity of L1   vertebra with bony retropulsion. Multiple sclerotic foci likely reflect   bony islands in thevertebral bodies noted. Diffuse osteopenia and   multilevel spondylosis.  Sacral decubitus ulcer.    IMPRESSION:     Linear soft tissue defect extending to the sacrum, consistent with   history of sacral decubitus ulcer.    Age-indeterminate severe L1 compression fracture deformity with bony   retropulsion. Correlate with site of pain.    Ulcerated atheromatous plaque with notable ulceration at the origin of   the celiac axis.    Bladder wall thickening due to underdistention versus cystitis. Correlate   with urinalysis.    Mild CBD dilatation without evidence of choledocholithiasis. Correlate   with alkaline phosphatase/LFT's.    < from: CT Lumbar Spine Reform w/ IV Cont (18 @ 06:21) >  FINDINGS:    LOWER CHEST: Calcific atherosclerosis of thoracic aorta.    LIVER: Within normal limits.  BILE DUCTS: Prominent CBD measures up to 8 to 9 mm in diameter.  GALLBLADDER: Distended.  SPLEEN: Within normal limits.  PANCREAS: Within normal limits.  ADRENALS: Within normal limits.    KIDNEYS/URETERS: Few subcentimeter hypodense structures, too small to   characterize. No hydronephrosis.  BLADDER: Bladder wall thickening due to underdistention versus cystitis.  REPRODUCTIVE ORGANS: The uterus and adnexa are within normal limits.    BOWEL: No bowel obstruction. Appendix within normal limits.  PERITONEUM: No free air..  VESSELS:  Calcific atherosclerosis of aorta. Multifocal irregular   atheromatous plaque, notably at the origin of the celiac axis indicating   ulcerated plaque.  RETROPERITONEUM: No lymphadenopathy.    ABDOMINAL WALL: Within normal limits.    BONES: Age-indeterminate moderate to severe compressive deformity of L1   vertebra with bony retropulsion. Multiple sclerotic foci likely reflect   bony islands in thevertebral bodies noted. Diffuse osteopenia and   multilevel spondylosis.  Sacral decubitus ulcer.    IMPRESSION:     Linear soft tissue defect extending to the sacrum, consistent with   history of sacral decubitus ulcer.    Age-indeterminate severe L1 compression fracture deformity with bony   retropulsion. Correlate with site of pain.    Ulcerated atheromatous plaque with notable ulceration at the origin of   the celiac axis.    Bladder wall thickening due to underdistention versus cystitis. Correlate   with urinalysis.    Mild CBD dilatation without evidence of choledocholithiasis. Correlate   with alkaline phosphatase/LFT's.

## 2018-06-09 DIAGNOSIS — I77.4 CELIAC ARTERY COMPRESSION SYNDROME: ICD-10-CM

## 2018-06-09 LAB
ALBUMIN SERPL ELPH-MCNC: 2.5 G/DL — LOW (ref 3.3–5)
ALP SERPL-CCNC: 165 U/L — HIGH (ref 40–120)
ALT FLD-CCNC: 9 U/L — LOW (ref 10–45)
AST SERPL-CCNC: 12 U/L — SIGNIFICANT CHANGE UP (ref 10–40)
BILIRUB DIRECT SERPL-MCNC: <0.1 MG/DL — SIGNIFICANT CHANGE UP (ref 0–0.2)
BILIRUB INDIRECT FLD-MCNC: >0.1 MG/DL — LOW (ref 0.2–1)
BILIRUB SERPL-MCNC: 0.2 MG/DL — SIGNIFICANT CHANGE UP (ref 0.2–1.2)
GGT SERPL-CCNC: 16 U/L — SIGNIFICANT CHANGE UP (ref 8–40)
GLUCOSE BLDC GLUCOMTR-MCNC: 127 MG/DL — HIGH (ref 70–99)
GLUCOSE BLDC GLUCOMTR-MCNC: 133 MG/DL — HIGH (ref 70–99)
GLUCOSE BLDC GLUCOMTR-MCNC: 189 MG/DL — HIGH (ref 70–99)
GLUCOSE BLDC GLUCOMTR-MCNC: 205 MG/DL — HIGH (ref 70–99)
HBA1C BLD-MCNC: 8.1 % — HIGH (ref 4–5.6)
HCT VFR BLD CALC: 28.4 % — LOW (ref 34.5–45)
HGB BLD-MCNC: 8.7 G/DL — LOW (ref 11.5–15.5)
IRON SATN MFR SERPL: 20 % — SIGNIFICANT CHANGE UP (ref 14–50)
IRON SATN MFR SERPL: 39 UG/DL — SIGNIFICANT CHANGE UP (ref 30–160)
MCHC RBC-ENTMCNC: 24.9 PG — LOW (ref 27–34)
MCHC RBC-ENTMCNC: 30.6 GM/DL — LOW (ref 32–36)
MCV RBC AUTO: 81.4 FL — SIGNIFICANT CHANGE UP (ref 80–100)
PLATELET # BLD AUTO: 501 K/UL — HIGH (ref 150–400)
PROT SERPL-MCNC: 6.2 G/DL — SIGNIFICANT CHANGE UP (ref 6–8.3)
RBC # BLD: 3.49 M/UL — LOW (ref 3.8–5.2)
RBC # FLD: 19.1 % — HIGH (ref 10.3–14.5)
TIBC SERPL-MCNC: 191 UG/DL — LOW (ref 220–430)
UIBC SERPL-MCNC: 152 UG/DL — SIGNIFICANT CHANGE UP (ref 110–370)
WBC # BLD: 11.39 K/UL — HIGH (ref 3.8–10.5)
WBC # FLD AUTO: 11.39 K/UL — HIGH (ref 3.8–10.5)

## 2018-06-09 PROCEDURE — 99232 SBSQ HOSP IP/OBS MODERATE 35: CPT

## 2018-06-09 RX ORDER — ASCORBIC ACID 60 MG
500 TABLET,CHEWABLE ORAL DAILY
Qty: 0 | Refills: 0 | Status: DISCONTINUED | OUTPATIENT
Start: 2018-06-09 | End: 2018-06-11

## 2018-06-09 RX ADMIN — PIPERACILLIN AND TAZOBACTAM 25 GRAM(S): 4; .5 INJECTION, POWDER, LYOPHILIZED, FOR SOLUTION INTRAVENOUS at 13:43

## 2018-06-09 RX ADMIN — Medication 25 MILLIGRAM(S): at 17:32

## 2018-06-09 RX ADMIN — Medication 500 MILLIGRAM(S): at 18:18

## 2018-06-09 RX ADMIN — ENOXAPARIN SODIUM 40 MILLIGRAM(S): 100 INJECTION SUBCUTANEOUS at 12:10

## 2018-06-09 RX ADMIN — PANTOPRAZOLE SODIUM 40 MILLIGRAM(S): 20 TABLET, DELAYED RELEASE ORAL at 05:26

## 2018-06-09 RX ADMIN — Medication 25 MILLIGRAM(S): at 05:26

## 2018-06-09 RX ADMIN — MORPHINE SULFATE 2 MILLIGRAM(S): 50 CAPSULE, EXTENDED RELEASE ORAL at 09:00

## 2018-06-09 RX ADMIN — Medication 1: at 12:44

## 2018-06-09 RX ADMIN — PIPERACILLIN AND TAZOBACTAM 25 GRAM(S): 4; .5 INJECTION, POWDER, LYOPHILIZED, FOR SOLUTION INTRAVENOUS at 21:22

## 2018-06-09 RX ADMIN — NYSTATIN CREAM 1 APPLICATION(S): 100000 CREAM TOPICAL at 17:32

## 2018-06-09 RX ADMIN — Medication 1 TABLET(S): at 18:18

## 2018-06-09 RX ADMIN — LISINOPRIL 5 MILLIGRAM(S): 2.5 TABLET ORAL at 05:26

## 2018-06-09 RX ADMIN — MORPHINE SULFATE 2 MILLIGRAM(S): 50 CAPSULE, EXTENDED RELEASE ORAL at 08:41

## 2018-06-09 RX ADMIN — NYSTATIN CREAM 1 APPLICATION(S): 100000 CREAM TOPICAL at 05:26

## 2018-06-09 RX ADMIN — PIPERACILLIN AND TAZOBACTAM 25 GRAM(S): 4; .5 INJECTION, POWDER, LYOPHILIZED, FOR SOLUTION INTRAVENOUS at 05:26

## 2018-06-09 NOTE — PHYSICAL THERAPY INITIAL EVALUATION ADULT - IMPAIRMENTS CONTRIBUTING IMPAIRED BED MOBILITY, REHAB EVAL
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

## 2018-06-09 NOTE — PHYSICAL THERAPY INITIAL EVALUATION ADULT - BALANCE TRAINING, PT EVAL
GOAL: Pt will demonstrate improved static/dynamic balance to fair, in order to improve stability, decrease fall risk and increase independence with ADLs within 4 weeks.

## 2018-06-09 NOTE — DIETITIAN INITIAL EVALUATION ADULT. - PHYSICAL APPEARANCE
BMI 30.1kg/m62, nutrition focused phycial exam deferred as pt was feeling chilling, covered in blanket and sheet, requested additional blanket./overweight

## 2018-06-09 NOTE — PHYSICAL THERAPY INITIAL EVALUATION ADULT - PASSIVE RANGE OF MOTION EXAMINATION, REHAB EVAL
no Passive ROM deficits were identified/except LUE, L elbow extension limited to -30 due to dislocation deformity, shoulder flexion limited to 80

## 2018-06-09 NOTE — DIETITIAN INITIAL EVALUATION ADULT. - NS AS NUTRI INTERV MEALS SNACK
Carbohydrate - modified diet/Recommend changing diet to carbohydrate consistent diet (no beef/no pork) as pt accepts fish and chicken./General/healthful diet Carbohydrate - modified diet/Recommend changing diet to carbohydrate consistent diet (no beef/no pork) as pt accepts fish and chicken. Encourage PO intake of nutrient dense, protein rich foods to optimize wound healing/General/healthful diet

## 2018-06-09 NOTE — DIETITIAN INITIAL EVALUATION ADULT. - ENERGY NEEDS
Ht: 60inches , Wt: 154lbs, BMI: 30.1kg/m2, IBW: 100lbs +/- 10%, %IBW: 154%  +3 left and right leg Edema, Skin: per wound care note (6/8): stage 3 right ischium, stage 4 left ankle    Pertinent Information: 67 year old female hx of T2DM, HTN, trauma, wheel chair bound functional quadriplegia presented with hx of multiple falls and FTT, found to have pressure injuries. Wound care and ID following.

## 2018-06-09 NOTE — PHYSICAL THERAPY INITIAL EVALUATION ADULT - DISCHARGE DISPOSITION, PT EVAL
TBD pending functional evaluation home w/ home PT/Pt has no insurance and getting home care eval  for services

## 2018-06-09 NOTE — PHYSICAL THERAPY INITIAL EVALUATION ADULT - PERTINENT HX OF CURRENT PROBLEM, REHAB EVAL
68 y/o F brought in by daughter for concern for multiple falls from wheel chair (bed bound  for  more than  20 years) and inability to care for herself. Pt had unwitnessed fall 2 weeks ago. Daughter noted ulcers to pt's buttocks, was complaining of back pain that was worse than her usual. Pt has a history of a traumatic injury in remote past consisting of falling out of a window; has had multiple surgeries, back injury causing some baseline loss of sensation in lower body.

## 2018-06-09 NOTE — PROGRESS NOTE ADULT - ASSESSMENT
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 No signs or symptoms concerning for acute or chronic mesenteric ischemia.     Recommend aspirin and statin, which patient is already on.   No vascular surgery intervention indicated at this time.   reviewed ct findings w pt and daughter and gdaughter  and recommend to continue med/conserv managemtn   if pt develops wt loss and additional mesenteric occlusive dz sz then she may need a visceral angio in the future  will follow

## 2018-06-09 NOTE — DIETITIAN INITIAL EVALUATION ADULT. - NUTRITION INTERVENTION
Medical Food Supplements/Vitamin/Meals and Snack/Collaboration and Referral of Nutrition Care Vitamin/Medical Food Supplements Medical Food Supplements/Vitamin/Nutrition Education/Meals and Snack/Collaboration and Referral of Nutrition Care

## 2018-06-09 NOTE — DIETITIAN INITIAL EVALUATION ADULT. - OTHER INFO
Patient seen for nutrition consult for pressure injuries. Patient reports UBW as 175 pounds however believes she has lost weight over the last few months, granddaughter agrees. Current dosing weight 154 pounds, pt with 21 pound weight loss. In-house pt with fair PO intake, likely completing 50% of meals. Pt drinking 1 Ensure Enlive per day. Writer discussed switching to Glucerna to help better manage blood sugars, pt amendable.

## 2018-06-09 NOTE — DIETITIAN INITIAL EVALUATION ADULT. - SIGNS/SYMPTOMS
>10% weight loss x 6 months, meeting </= 75% estimated needs >1 month pt with stage 3 (ischium) and stage 4 (left ankle) pressure injuries

## 2018-06-09 NOTE — CHART NOTE - NSCHARTNOTEFT_GEN_A_CORE
Pt with L1 Compression fracture on CT LS spine. Consults called for Neuro Spine and Neurology to evaluate pt.  Orthopedic MD also called to revisit pt - seen by Ortho in ED. Spoke with Ortho MD, Dr Patel who stated that   Ortho saw pt in ED for falls, and pt should be evaluated by Neuro Spine MD. Left message for Neurology with   Answering Service at Ph # [phone]. Neuro Surg saw pt with foll recommendations:  TLSO brace when oob, pain control, ortho consult for right knee pain, fu outpatient 1-2 weeks after discharge with Marleni.

## 2018-06-09 NOTE — DIETITIAN INITIAL EVALUATION ADULT. - PERTINENT LABORATORY DATA
POCT glucose: 6/9; 133mg/dL, 6/8: 104-381mg/dL, 114-251mg/dL, ALT 9, Alkaline phosphatase; 165, (6/80 HbA1c 8.1%

## 2018-06-09 NOTE — PROGRESS NOTE ADULT - ASSESSMENT
pt    with h/o  htn,    dm  2, mlple falls, now  wheel chair bound.  beb bound, functional  quadriplegia  , s/p  h/o traumatic fall, from window, 20  yrs  ago   now  with ftt/ sacral  decubiti.    on zosyn   wound care  and ID  , saw   pt  pt  eval  anemia, gi  called  dvt ppx  med list  not available  from [place],  family  at  bedside now,  states she  takes  bp  meds/ cozaar   and  metformin  family also  wants placement  doppler  legs.  distal ulnar fx left  arm, ortho called,  sttates,  plan is  non operative  seen by wound  care  pt doing better    < from: Xray Shoulder 2 Views, Left (06.07.18 @ 04:51) >    IMPRESSION: There is chronic resorption of the humeral head with chronic   remodeling of the glenoid articular surface with adjacent osseous   productive change. This may be compatible with a neuropathic joint. There   is dislocation of the humeral shaft with respect to the glenoid. No acute   fracture is seen.    < from: Xray Wrist 3 Views, Left (06.07.18 @ 04:47) >    IMPRESSION:     Acute mildly displaced distal ulna fracture.      < from: Xray Elbow AP + Lateral, Left (06.07.18 @ 04:47) >  COMPARISON: None.    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 There is no obvious   acute fracture identified. There is mild deformity and cortical   thickening at the mid shaft of the ulna related to healing of a prior   fracture.            < end of copied text > pt    with h/o  htn,    dm  2, mlple falls, now  wheel chair bound.  beb bound, functional  quadriplegia  , s/p  h/o traumatic fall, from window, 20  yrs  ago   now  with ftt/ sacral  decubiti.    on zosyn   wound care  and ID  , saw   pt  pt  eval  anemia, gi  called  dvt ppx  med list  not available  from [place],  family  at  bedside now,  states she  takes  bp  meds/ cozaar   and  metformin  family also  wants placement  doppler  legs.  distal ulnar fx left  arm, ortho called,  sttates,  plan is  non operative  seen by wound  care  pt doing better  severe  protein  calorie   malnutrition    < from: Xray Shoulder 2 Views, Left (06.07.18 @ 04:51) >    IMPRESSION: There is chronic resorption of the humeral head with chronic   remodeling of the glenoid articular surface with adjacent osseous   productive change. This may be compatible with a neuropathic joint. There   is dislocation of the humeral shaft with respect to the glenoid. No acute   fracture is seen.    < from: Xray Wrist 3 Views, Left (06.07.18 @ 04:47) >    IMPRESSION:     Acute mildly displaced distal ulna fracture.      < from: Xray Elbow AP + Lateral, Left (06.07.18 @ 04:47) >  COMPARISON: None.    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 There is no obvious   acute fracture identified. There is mild deformity and cortical   thickening at the mid shaft of the ulna related to healing of a prior   fracture.            < end of copied text >

## 2018-06-09 NOTE — PHYSICAL THERAPY INITIAL EVALUATION ADULT - BALANCE DISTURBANCE, IDENTIFIED IMPAIRMENT CONTRIBUTE, REHAB EVAL
abnormal muscle tone/impaired sensory feedback/decreased strength/impaired coordination/impaired motor control/impaired postural control

## 2018-06-09 NOTE — DIETITIAN INITIAL EVALUATION ADULT. - NS AS NUTRI INTERV ED CONTENT
Purpose of the nutrition education/Provided education on Carbohydrate Consistent diet including sources of carbohydrates, portion sizes, pairing protein with carbohydrates, limiting sugar sweetened beverages in diet and the importance of consistent eating pattern to help optimize glycemic control.

## 2018-06-09 NOTE — DIETITIAN INITIAL EVALUATION ADULT. - ETIOLOGY
inadequate protein-energy intake in setting of decreased appetite increased demand for nutrients for wound healing

## 2018-06-09 NOTE — DIETITIAN INITIAL EVALUATION ADULT. - ORAL INTAKE PTA
fair/Patient reports decreased PO intake within the last 6 months. Pt reports she consumes 3 meals per day however is consuming smaller portions. Typical diet recall: Breakfast: toast with butter, tea, Lunch: figs, salad, chicken, vegetables, Dinner: bread, tea, chicken/fish, rice, left overs from lunch. Confirms NKFA. Denies micronutrient supplementation. Pt denies chewing/swallowing difficulty, vomiting, diarrhea, constipation. Does endorse occasional nausea (1-2x every month). Eats chicken and fish but does not eat pork or beef.

## 2018-06-09 NOTE — DIETITIAN INITIAL EVALUATION ADULT. - ADHERENCE
good/Patient noted with T2DM. Pt reports she check blood sugar 3x per day, ranges between 100-150mg/dL but has gone as high as 300s (rare occasions). Limits portion of rice, bread, avoids juice.

## 2018-06-09 NOTE — CHART NOTE - NSCHARTNOTEFT_GEN_A_CORE
Upon Nutritional Assessment by the Registered Dietitian your patient was determined to meet criteria / has evidence of the following diagnosis/diagnoses:          [ ]  Mild Protein Calorie Malnutrition        [ ]  Moderate Protein Calorie Malnutrition        [ x] Severe Protein Calorie Malnutrition        [ ] Unspecified Protein Calorie Malnutrition        [ ] Underweight / BMI <19        [ ] Morbid Obesity / BMI > 40      Findings as based on:  [x ] Comprehensive nutrition assessment   [ ] Nutrition Focused Physical Exam  [ ] Other:  >10% weight loss x 6 months, meeting </= 75% estimated needs >1 month        Nutrition Plan/Recommendations:    1. Recommend change diet to carbohydrate consistent (no beef/no pork)   2. Recommend Glucerna 2x per day       PROVIDER Section:     By signing this assessment you are acknowledging and agree with the diagnosis/diagnoses assigned by the Registered Dietitian    Comments:

## 2018-06-09 NOTE — CONSULT NOTE ADULT - CONSULT REQUESTED DATE/TIME
07-Jun-2018 11:49
07-Jun-2018 12:42
07-Jun-2018 15:18
08-Jun-2018 12:31
08-Jun-2018 16:28
09-Jun-2018 16:20
07-Jun-2018 14:32

## 2018-06-09 NOTE — PHYSICAL THERAPY INITIAL EVALUATION ADULT - ADDITIONAL COMMENTS
Correlate with site of pain. Ulcerated atheromatous plaque with notable ulceration at the origin of the celiac axis. Bladder wall thickening due to underdistention versus cystitis. Correlate with urinalysis. Mild CBD dilatation without evidence of choledocholithiasis. Correlate with alkaline phosphatase/LFT's. CTH: No acute intracranial bleeding, mass effect, or shift. Cerebellar vermin volume loss. Mild chronic microvascular ischemic changes. US Abdomen: No evidence of acute cholecystitis or choledocholithiasis.     Pt lives in [place] with HHA. Daughter states that patient uses a wheelchair at baseline, but she performs her ADLs independently. Per SW notes, patient does not currently have insurance and is undocumented. Correlate with site of pain. Ulcerated atheromatous plaque with notable ulceration at the origin of the celiac axis. Bladder wall thickening due to underdistention versus cystitis. Correlate with urinalysis. Mild CBD dilatation without evidence of choledocholithiasis. Correlate with alkaline phosphatase/LFT's. CTH: No acute intracranial bleeding, mass effect, or shift. Cerebellar vermin volume loss. Mild chronic microvascular ischemic changes. US Abdomen: No evidence of acute cholecystitis or choledocholithiasis.     Pt lives in [place] with [place]. Daughter states that patient uses a wheelchair at baseline, but she performs her ADLs independently. Prior to her fall 3 weeks ago, pt was standing from wheelchair and was able to perform squat pivot transfers to/from the chair (including in the bathroom) at times using HHA for support, at times performing transfer independently. Per  notes, patient does not currently have insurance and is undocumented. Correlate with site of pain. Ulcerated atheromatous plaque with notable ulceration at the origin of the celiac axis. Bladder wall thickening due to underdistention versus cystitis. Correlate with urinalysis. Mild CBD dilatation without evidence of choledocholithiasis. Correlate with alkaline phosphatase/LFT's. CTH: No acute intracranial bleeding, mass effect, or shift. Cerebellar vermin volume loss. Mild chronic microvascular ischemic changes. US Abdomen: No evidence of acute cholecystitis or choledocholithiasis.   Pt lives in [place] with [place]. Daughter states that patient uses a wheelchair at baseline, but she performs her ADLs independently. Prior to her fall 3 wks ago, pt was standing from wheelchair and was able to perform squat pivot transfers to/from the chair (including in the bathroom) at times using HHA for support, at times performing transfer independently. Per  notes, patient does not currently have insurance and is undocumented.Pt lives with dtr in a private house with +ramp.

## 2018-06-09 NOTE — PROGRESS NOTE ADULT - ASSESSMENT
67 year old female hx of trauma wheel chair bound functional quadraplegia presented with hx of multiple falls and FTT   reviewed her UA there were a few white cell noted but not significant to call it a possible uti, the patient did complain of dysuria  Reviewed cxr and was read as clear lungs.   Examined the patient with the the wound care team the bedside and it appears she has diffuse excoriation of the buttocks of the skin likely related to moisture from urine or stool, she also has ulcer in sacral area   reviewed no cx data available     Plan  1. continue zosyn empiric to cover for decub ulcers, antifugal treatment fungal dermatitis skin fold   2.  wound care of buttocks sacral area as per the wound care team     supportive care as per medicine

## 2018-06-09 NOTE — PHYSICAL THERAPY INITIAL EVALUATION ADULT - MANUAL MUSCLE TESTING RESULTS, REHAB EVAL
RUE at least 3/5, LUE 0/5 shoulder flexion, 3/5 elbow/wrist flexion, RLE 2+/5 proximally, 0/5 distally, LLE 2-/5 proximally (including quad set), trace PF contraction, 0/5 DF

## 2018-06-09 NOTE — CONSULT NOTE ADULT - ASSESSMENT
67 year old female hx of trauma wheel chair bound functional quadraplegia presented with hx of multiple falls and FTT   reviewed her UA there were a few white cell noted but not significant to call it a possible uti, the patient did complain of dysuria.  She in ED not able to assess decubs given bed she was in, I wound recommend getting plastic surgery to perform assessment of ulcer and make recommendation for care of ulcer   Reviewed cxr and was read as clear lungs.   Plan  1. continue zosyn empiric for possible uti pending cx results and will also cover for decub ulcers  2. suggest a plastics surgery consult to assess decub for wound care    supportive care as per medicine
67 year old female with PMH HTN, DM, decubiti, functional quadriplegia s/p fall/trauma now admitted s/p fall from wheelchair, FTT, decubiti    Noted to have drop in Hgb (normocytic) and not clear what is pt's baseline Hgb. Currently without overt/brisk GI bleed at this time   - Monitor CBC and BMs  - Check iron indices    Distended GB with CBD dilation on CT, but clinically with benign abdominal exam and has been tolerating PO diet  - would check US  - trend LFTs  - add GGT (given extensive sacral ulcer with extension to the sacrum, elevated alk. phos may be secondary to bony involvement)    FTT, extensive wounds  - await wound care evaluation and input  - will add PO supplements to increase protein intake      Further care/work-up as per primary team   Discussed with pt and family at bedside  Medicine attending updated    Thank you for the courtesy of this consult.  Jose Mojica PA-C    [facility]  [phone]
68 yo female with poor baseline function with moderate pain at the coccyx and left knee.  Patient family and patient state she is at baseline strength except LLE pain limited. CT showed age indeterminate L1 compression fx > 50% loss in height with retropulsion. TLSO brace when oob, pain control, ortho consult for right knee pain, fu outpatient 1-2 weeks after discharge with lacy.
A/P:  67yoF  with h/o  htn,  dm  2, mlple falls, now  wheel chair bound, functional  quadriplegia after traumatic fall, from window, 20  yrs  ago admitted w/ now  with ftt/ sacral wound    Extreme Moisture Dermatitis - 3 Day CAVILON w/ pericare  Rt Ischial stage 3 pressure ulcer AQUACEL dressing  Lt ankle stage 4 lateral malleolar pressure ulcer- medihoney dressing  Consider XRay, Duplex  BLE elevation  Abx per Medicine/ ID  Moisturize intact skin w/ SWEEN cream BID  con't Nutrition (as tolerated), Nutrition Consult  con't Offloading   con't Pericare  Care as per medicine will follow w/ you  Upon discharge f/u as outpatient at [facility] [address] [facility] [phone]  Seen w/ attng and D/w team  Thank you for this consult  Aurelia Taylor PA-C CWS [id number]
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 No signs or symptoms concerning for acute or chronic mesenteric ischemia.     Recommend aspirin and statin, which patient is already on.   No vascular surgery intervention indicated at this time.     D/w Attending.    Christine, PGY3

## 2018-06-09 NOTE — PHYSICAL THERAPY INITIAL EVALUATION ADULT - PRECAUTIONS/LIMITATIONS, REHAB EVAL
fall precautions/Xray L wrist: Acute mildly displaced distal ulna fracture. Xray L shoulder: chronic resorption of the humeral head with chronic remodeling of the glenoid articular surface with adjacent osseous productive change. This may be compatible with a neuropathic joint. There is dislocation of the humeral shaft with respect to the glenoid. No acute fracture is seen. Xray pelvis: No displaced acute fracture or dislocation. Xray L elbow: severe chronic posttraumatic osteoarthritis and dislocation of the elbow joint with a cerclage wire. There is no obvious acute fracture identified. There is mild deformity and cortical thickening at the mid shaft of the ulna related to healing of a prior fracture. Xray: Right ankle: There is chronic deformity of the inferior aspect of the posterior process of the calcaneus that is likely postsurgical in nature. Mild tibiotalar and posterior subtalar arthrosis is present. There is vascular calcification. Moderate superficial soft tissue swelling is present. There is no fracture. Xray Left ankle: There is osseous. Change at the posterior process of the calcaneus that is likely from enthesopathy or old infection. There is a linear radiopaque foreign body in between the first and second metatarsals possibly representing a needle. Moderate circumferential soft tissue swelling is present. There is no fracture. Mild tibiotalar and posterior subtalar arthrosis is present. BLE doppler: No evidence of bilateral lower extremity deep venous thrombosis. CT Lumbar Spine/Abdomen/Pelvis: Linear soft tissue defect extending to the sacrum, consistent with history of sacral decubitus ulcer. Age-indeterminate severe L1 compression fracture deformity with bony retropulsion. Cont'd... spinal precautions/Xray L wrist: Acute mildly displaced distal ulna fracture. Xray L shoulder: chronic resorption of the humeral head with chronic remodeling of the glenoid articular surface with adjacent osseous productive change. This may be compatible with a neuropathic joint. There is dislocation of the humeral shaft with respect to the glenoid. No acute fracture is seen. Xray pelvis: No displaced acute fracture or dislocation. Xray L elbow: severe chronic posttraumatic osteoarthritis and dislocation of the elbow joint with a cerclage wire. There is no obvious acute fracture identified. There is mild deformity and cortical thickening at the mid shaft of the ulna related to healing of a prior fracture. Xray: Right ankle: There is chronic deformity of the inferior aspect of the posterior process of the calcaneus that is likely postsurgical in nature. Mild tibiotalar and posterior subtalar arthrosis is present. There is vascular calcification. Moderate superficial soft tissue swelling is present. There is no fracture. Xray Left ankle: There is osseous. Change at the posterior process of the calcaneus that is likely from enthesopathy or old infection. There is a linear radiopaque foreign body in between the first and second metatarsals possibly representing a needle. Moderate circumferential soft tissue swelling is present. There is no fracture. Mild tibiotalar and posterior subtalar arthrosis is present. BLE doppler: No evidence of bilateral lower extremity deep venous thrombosis. CT Lumbar Spine/Abdomen/Pelvis: Linear soft tissue defect extending to the sacrum, consistent with history of sacral decubitus ulcer. Age-indeterminate severe L1 compression fracture deformity with bony retropulsion. Cont'd.../fall precautions

## 2018-06-09 NOTE — CONSULT NOTE ADULT - SUBJECTIVE AND OBJECTIVE BOX
p (6862)     HPI:  High Risk Travel:  International Travel? No(1)     67y Female complaining of  decubiti.	  : 66 y/o F pmhx htn, dm, brought in by daughter for concern for multiple falls, from wheel chair,  bed bound  for  more than  20 years,  and inability to care for herself.  Daughter reports patient lives in [place], and has a home health aide that is in the house with her throughout the day.. . Daughter reports that 2 weeks ago patient had a t fall that was unwitnessed,  Daughter states that patient uses a wheelchair at baseline, . States that daughter went down to [place] to assess situation x2 days ago and noted that patient had ulcers to her buttocks, was complaining of back pain that was worse than her usual, and daughter was significantly concerned about her ability to care for self. Brought her up to [place] immediately.  Patient has a history of a traumatic injury in remote past consisting of falling out of a window; has had multiple surgeries, back injury causing some baseline loss of sensation in lower body, and is wheelchair bound at baseline. (07 Jun 2018 10:40)    66 yo female with poor baseline function (wheelchair bound, fall from window 20 years ago, daytime home house aid) in hospital for failure to thrive at home.  States that she has had falls off her wheelchair in the past and complains now of midline tenderness at the coccyx. Also complains of left knee pain. Patient and patient family state her strength is at baseline except her knee pain limits her mobility. CT showed age indeterminate L1 compression fx > 50% loss in height with retropulsion.     PAST MEDICAL HISTORY     PAST SURGICAL HISTORY         MEDICATIONS:  Antibiotics:  piperacillin/tazobactam IVPB. 3.375 Gram(s) IV Intermittent every 8 hours    Neuro:  meclizine 25 milliGRAM(s) Oral daily PRN  morphine  - Injectable 2 milliGRAM(s) IV Push daily PRN    Anticoagulation:  enoxaparin Injectable 40 milliGRAM(s) SubCutaneous daily    Other:  dextrose 40% Gel 15 Gram(s) Oral once PRN  dextrose 5%. 1000 milliLiter(s) IV Continuous <Continuous>  dextrose 50% Injectable 12.5 Gram(s) IV Push once  dextrose 50% Injectable 25 Gram(s) IV Push once  dextrose 50% Injectable 25 Gram(s) IV Push once  glucagon  Injectable 1 milliGRAM(s) IntraMuscular once PRN  insulin lispro (HumaLOG) corrective regimen sliding scale   SubCutaneous three times a day before meals  insulin lispro (HumaLOG) corrective regimen sliding scale   SubCutaneous at bedtime  lisinopril 5 milliGRAM(s) Oral daily  metoprolol tartrate 25 milliGRAM(s) Oral two times a day  pantoprazole    Tablet 40 milliGRAM(s) Oral before breakfast      SOCIAL HISTORY:   Occupation:   Marital Status:     FAMILY HISTORY:      REVIEW OF SYSTEMS:  Check here if all are normal other than Neurological/HPI [x]  General:  Eyes:  ENT:  Cardiac:  Respiratory:  GI:  Musculoskeletal:   Skin:  Neurologic:   Psychiatric:     PHYSICAL EXAMINATION:   T(C): 36.7 (06-09-18 @ 10:58), Max: 36.8 (06-08-18 @ 21:17)  HR: 94 (06-09-18 @ 10:58) (89 - 99)  BP: 96/58 (06-09-18 @ 10:58) (96/58 - 117/64)  RR: 18 (06-09-18 @ 10:58) (18 - 18)  SpO2: 96% (06-09-18 @ 10:58) (95% - 99%)  Wt(kg): --    General Examination:     Neurologic Examination:           PHYSICAL EXAM:    Constitutional: No Acute Distress     Neurological: AOx3, Following Commands    Motor exam (all is baseline except LLE HF/KF/KE which are limited by pain):          Upper extremity                         Delt     Bicep     Tricep    HG                                                 R         4/5       4/5        4/5       4/5                                               L          3/5        3/5        3/5       3/5          Lower extremity                        HF         KF        KE       DF         PF                                                  R        3/5        3/5        3/5       2/5         2/5                                               L         1/5        1/5       1/5       0/5          0/5                                                 Sensation: [] intact to light touch  [x] decreased: plantar/dorsum (baseline)    Incision:     LABS:                        8.7    11.39 )-----------( 501      ( 09 Jun 2018 08:25 )             28.4     06-08    138  |  104  |  36<H>  ----------------------------<  122<H>  4.0   |  26  |  0.54    Ca    9.0      08 Jun 2018 07:10    TPro  6.2  /  Alb  2.5<L>  /  TBili  0.2  /  DBili  <0.1  /  AST  12  /  ALT  9<L>  /  AlkPhos  165<H>  06-09          RADIOLOGY & ADDITIONAL STUDIES: p (1101)     HPI:  High Risk Travel:  International Travel? No(1)     67y Female complaining of  decubiti.	  : 68 y/o F pmhx htn, dm, brought in by daughter for concern for multiple falls, from wheel chair,  bed bound  for  more than  20 years,  and inability to care for herself.  Daughter reports patient lives in [place], and has a home health aide that is in the house with her throughout the day.. . Daughter reports that 2 weeks ago patient had a t fall that was unwitnessed,  Daughter states that patient uses a wheelchair at baseline, . States that daughter went down to [place] to assess situation x2 days ago and noted that patient had ulcers to her buttocks, was complaining of back pain that was worse than her usual, and daughter was significantly concerned about her ability to care for self. Brought her up to [place] immediately.  Patient has a history of a traumatic injury in remote past consisting of falling out of a window; has had multiple surgeries, back injury causing some baseline loss of sensation in lower body, and is wheelchair bound at baseline. (07 Jun 2018 10:40)    68 yo female with poor baseline function (wheelchair bound, fall from window 20 years ago, daytime home house aid) in hospital for failure to thrive at home.  States that she has had falls off her wheelchair in the past and complains now of midline tenderness at the coccyx. Also complains of left knee pain. Patient and patient family state her strength is at baseline except her knee pain limits her mobility. No saddle anesthesia, incontinence. CT showed age indeterminate L1 compression fx > 50% loss in height with retropulsion.     PAST MEDICAL HISTORY     PAST SURGICAL HISTORY         MEDICATIONS:  Antibiotics:  piperacillin/tazobactam IVPB. 3.375 Gram(s) IV Intermittent every 8 hours    Neuro:  meclizine 25 milliGRAM(s) Oral daily PRN  morphine  - Injectable 2 milliGRAM(s) IV Push daily PRN    Anticoagulation:  enoxaparin Injectable 40 milliGRAM(s) SubCutaneous daily    Other:  dextrose 40% Gel 15 Gram(s) Oral once PRN  dextrose 5%. 1000 milliLiter(s) IV Continuous <Continuous>  dextrose 50% Injectable 12.5 Gram(s) IV Push once  dextrose 50% Injectable 25 Gram(s) IV Push once  dextrose 50% Injectable 25 Gram(s) IV Push once  glucagon  Injectable 1 milliGRAM(s) IntraMuscular once PRN  insulin lispro (HumaLOG) corrective regimen sliding scale   SubCutaneous three times a day before meals  insulin lispro (HumaLOG) corrective regimen sliding scale   SubCutaneous at bedtime  lisinopril 5 milliGRAM(s) Oral daily  metoprolol tartrate 25 milliGRAM(s) Oral two times a day  pantoprazole    Tablet 40 milliGRAM(s) Oral before breakfast      SOCIAL HISTORY:   Occupation:   Marital Status:     FAMILY HISTORY:      REVIEW OF SYSTEMS:  Check here if all are normal other than Neurological/HPI [x]  General:  Eyes:  ENT:  Cardiac:  Respiratory:  GI:  Musculoskeletal:   Skin:  Neurologic:   Psychiatric:     PHYSICAL EXAMINATION:   T(C): 36.7 (06-09-18 @ 10:58), Max: 36.8 (06-08-18 @ 21:17)  HR: 94 (06-09-18 @ 10:58) (89 - 99)  BP: 96/58 (06-09-18 @ 10:58) (96/58 - 117/64)  RR: 18 (06-09-18 @ 10:58) (18 - 18)  SpO2: 96% (06-09-18 @ 10:58) (95% - 99%)  Wt(kg): --    General Examination:     Neurologic Examination:           PHYSICAL EXAM:    Constitutional: No Acute Distress     Neurological: AOx3, Following Commands    Motor exam (all is baseline except LLE HF/KF/KE which are limited by pain):          Upper extremity                         Delt     Bicep     Tricep    HG                                                 R         4/5       4/5        4/5       4/5                                               L          0/5        3/5        3/5       3/5          Lower extremity                        HF         KF        KE       DF         PF                                                  R        3/5        3/5        3/5       2/5         2/5                                               L         1/5        1/5       1/5       0/5          0/5                                                 Sensation: [] intact to light touch  [x] decreased: plantar/dorsum (baseline)    Incision:     LABS:                        8.7    11.39 )-----------( 501      ( 09 Jun 2018 08:25 )             28.4     06-08    138  |  104  |  36<H>  ----------------------------<  122<H>  4.0   |  26  |  0.54    Ca    9.0      08 Jun 2018 07:10    TPro  6.2  /  Alb  2.5<L>  /  TBili  0.2  /  DBili  <0.1  /  AST  12  /  ALT  9<L>  /  AlkPhos  165<H>  06-09          RADIOLOGY & ADDITIONAL STUDIES:

## 2018-06-10 LAB
BASOPHILS # BLD AUTO: 0.25 K/UL — HIGH (ref 0–0.2)
BASOPHILS NFR BLD AUTO: 2 % — SIGNIFICANT CHANGE UP (ref 0–2)
EOSINOPHIL # BLD AUTO: 0.37 K/UL — SIGNIFICANT CHANGE UP (ref 0–0.5)
EOSINOPHIL NFR BLD AUTO: 3 % — SIGNIFICANT CHANGE UP (ref 0–6)
GLUCOSE BLDC GLUCOMTR-MCNC: 139 MG/DL — HIGH (ref 70–99)
GLUCOSE BLDC GLUCOMTR-MCNC: 178 MG/DL — HIGH (ref 70–99)
GLUCOSE BLDC GLUCOMTR-MCNC: 182 MG/DL — HIGH (ref 70–99)
GLUCOSE BLDC GLUCOMTR-MCNC: 204 MG/DL — HIGH (ref 70–99)
HCT VFR BLD CALC: 32.3 % — LOW (ref 34.5–45)
HGB BLD-MCNC: 9.9 G/DL — LOW (ref 11.5–15.5)
LYMPHOCYTES # BLD AUTO: 18 % — SIGNIFICANT CHANGE UP (ref 13–44)
LYMPHOCYTES # BLD AUTO: 2.22 K/UL — SIGNIFICANT CHANGE UP (ref 1–3.3)
MCHC RBC-ENTMCNC: 25.5 PG — LOW (ref 27–34)
MCHC RBC-ENTMCNC: 30.7 GM/DL — LOW (ref 32–36)
MCV RBC AUTO: 83.2 FL — SIGNIFICANT CHANGE UP (ref 80–100)
MONOCYTES # BLD AUTO: 0.86 K/UL — SIGNIFICANT CHANGE UP (ref 0–0.9)
MONOCYTES NFR BLD AUTO: 7 % — SIGNIFICANT CHANGE UP (ref 2–14)
NEUTROPHILS # BLD AUTO: 8.14 K/UL — HIGH (ref 1.8–7.4)
NEUTROPHILS NFR BLD AUTO: 66 % — SIGNIFICANT CHANGE UP (ref 43–77)
PLATELET # BLD AUTO: 520 K/UL — HIGH (ref 150–400)
RBC # BLD: 3.88 M/UL — SIGNIFICANT CHANGE UP (ref 3.8–5.2)
RBC # FLD: 19.1 % — HIGH (ref 10.3–14.5)
WBC # BLD: 12.34 K/UL — HIGH (ref 3.8–10.5)
WBC # FLD AUTO: 12.34 K/UL — HIGH (ref 3.8–10.5)

## 2018-06-10 PROCEDURE — 99232 SBSQ HOSP IP/OBS MODERATE 35: CPT

## 2018-06-10 RX ADMIN — Medication 500 MILLIGRAM(S): at 12:04

## 2018-06-10 RX ADMIN — PIPERACILLIN AND TAZOBACTAM 25 GRAM(S): 4; .5 INJECTION, POWDER, LYOPHILIZED, FOR SOLUTION INTRAVENOUS at 21:58

## 2018-06-10 RX ADMIN — Medication 25 MILLIGRAM(S): at 06:11

## 2018-06-10 RX ADMIN — Medication 1 TABLET(S): at 12:04

## 2018-06-10 RX ADMIN — LISINOPRIL 5 MILLIGRAM(S): 2.5 TABLET ORAL at 06:11

## 2018-06-10 RX ADMIN — PIPERACILLIN AND TAZOBACTAM 25 GRAM(S): 4; .5 INJECTION, POWDER, LYOPHILIZED, FOR SOLUTION INTRAVENOUS at 14:27

## 2018-06-10 RX ADMIN — Medication 25 MILLIGRAM(S): at 18:03

## 2018-06-10 RX ADMIN — ENOXAPARIN SODIUM 40 MILLIGRAM(S): 100 INJECTION SUBCUTANEOUS at 12:04

## 2018-06-10 RX ADMIN — PANTOPRAZOLE SODIUM 40 MILLIGRAM(S): 20 TABLET, DELAYED RELEASE ORAL at 06:11

## 2018-06-10 RX ADMIN — Medication 1: at 18:02

## 2018-06-10 RX ADMIN — Medication 1: at 13:12

## 2018-06-10 RX ADMIN — NYSTATIN CREAM 1 APPLICATION(S): 100000 CREAM TOPICAL at 06:11

## 2018-06-10 RX ADMIN — NYSTATIN CREAM 1 APPLICATION(S): 100000 CREAM TOPICAL at 18:03

## 2018-06-10 RX ADMIN — PIPERACILLIN AND TAZOBACTAM 25 GRAM(S): 4; .5 INJECTION, POWDER, LYOPHILIZED, FOR SOLUTION INTRAVENOUS at 06:11

## 2018-06-10 NOTE — PROGRESS NOTE ADULT - ASSESSMENT
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 No signs or symptoms concerning for acute or chronic mesenteric ischemia.     Recommend aspirin and statin, which patient is already on.   No vascular surgery intervention indicated at this time.   will follow

## 2018-06-10 NOTE — PROGRESS NOTE ADULT - ASSESSMENT
pt    with h/o  htn,    dm  2, mlple falls, now  wheel chair bound.  beb bound, functional  quadriplegia  , s/p  h/o traumatic fall, from window, 20  yrs  ago   now  with ftt/ sacral  decubiti.    on zosyn, last day is  monday   wound care  and ID  , saw   pt  pt  eval  anemia, gi  called  dvt ppx  med list  not available  from [place],  family  at  bedside now,  states she  takes  bp  meds/ cozaar   and  metformin  family also  wants placement  doppler  legs.  distal ulnar fx left  arm, ortho called,    plan is  non operative  seen by wound  care/ neuro surg for comp fx spine/ and vascular, no intervention   pt doing better  severe  protein  calorie   malnutrition  dc  planning to rehab on tuesday

## 2018-06-10 NOTE — PROGRESS NOTE ADULT - ASSESSMENT
67 year old female hx of trauma wheel chair bound functional quadraplegia presented with hx of multiple falls and FTT   reviewed her UA there were a few white cell noted but not significant to call it a possible uti, the patient did complain of dysuria  Reviewed cxr and was read as clear lungs.   Examined the patient with the the wound care team the bedside and it appears she has diffuse excoriation of the buttocks of the skin likely related to moisture from urine or stool, she also has ulcer in sacral area   reviewed no cx data available     Plan  Day 4 of 5 of abx   1. continue zosyn empiric to cover for decub ulcers tomorrow last day of abx , antifugal treatment fungal dermatitis skin fold   2.  local wound care of buttocks sacral area as per the wound care team is most important   supportive care as per medicine

## 2018-06-11 VITALS
DIASTOLIC BLOOD PRESSURE: 79 MMHG | SYSTOLIC BLOOD PRESSURE: 142 MMHG | OXYGEN SATURATION: 100 % | HEART RATE: 113 BPM | RESPIRATION RATE: 18 BRPM | TEMPERATURE: 98 F

## 2018-06-11 LAB
ANION GAP SERPL CALC-SCNC: 11 MMOL/L — SIGNIFICANT CHANGE UP (ref 5–17)
BUN SERPL-MCNC: 18 MG/DL — SIGNIFICANT CHANGE UP (ref 7–23)
CALCIUM SERPL-MCNC: 8.5 MG/DL — SIGNIFICANT CHANGE UP (ref 8.4–10.5)
CHLORIDE SERPL-SCNC: 103 MMOL/L — SIGNIFICANT CHANGE UP (ref 96–108)
CO2 SERPL-SCNC: 25 MMOL/L — SIGNIFICANT CHANGE UP (ref 22–31)
CREAT SERPL-MCNC: 0.42 MG/DL — LOW (ref 0.5–1.3)
GLUCOSE BLDC GLUCOMTR-MCNC: 133 MG/DL — HIGH (ref 70–99)
GLUCOSE BLDC GLUCOMTR-MCNC: 135 MG/DL — HIGH (ref 70–99)
GLUCOSE SERPL-MCNC: 150 MG/DL — HIGH (ref 70–99)
HCT VFR BLD CALC: 26.9 % — LOW (ref 34.5–45)
HGB BLD-MCNC: 8.4 G/DL — LOW (ref 11.5–15.5)
MCHC RBC-ENTMCNC: 25.8 PG — LOW (ref 27–34)
MCHC RBC-ENTMCNC: 31.2 GM/DL — LOW (ref 32–36)
MCV RBC AUTO: 82.5 FL — SIGNIFICANT CHANGE UP (ref 80–100)
PLATELET # BLD AUTO: 471 K/UL — HIGH (ref 150–400)
POTASSIUM SERPL-MCNC: 4.1 MMOL/L — SIGNIFICANT CHANGE UP (ref 3.5–5.3)
POTASSIUM SERPL-SCNC: 4.1 MMOL/L — SIGNIFICANT CHANGE UP (ref 3.5–5.3)
RBC # BLD: 3.26 M/UL — LOW (ref 3.8–5.2)
RBC # FLD: 19 % — HIGH (ref 10.3–14.5)
SODIUM SERPL-SCNC: 139 MMOL/L — SIGNIFICANT CHANGE UP (ref 135–145)
WBC # BLD: 11.71 K/UL — HIGH (ref 3.8–10.5)
WBC # FLD AUTO: 11.71 K/UL — HIGH (ref 3.8–10.5)

## 2018-06-11 PROCEDURE — 73610 X-RAY EXAM OF ANKLE: CPT

## 2018-06-11 PROCEDURE — 82803 BLOOD GASES ANY COMBINATION: CPT

## 2018-06-11 PROCEDURE — 82330 ASSAY OF CALCIUM: CPT

## 2018-06-11 PROCEDURE — 84132 ASSAY OF SERUM POTASSIUM: CPT

## 2018-06-11 PROCEDURE — 93306 TTE W/DOPPLER COMPLETE: CPT

## 2018-06-11 PROCEDURE — 76700 US EXAM ABDOM COMPLETE: CPT

## 2018-06-11 PROCEDURE — 85014 HEMATOCRIT: CPT

## 2018-06-11 PROCEDURE — 80053 COMPREHEN METABOLIC PANEL: CPT

## 2018-06-11 PROCEDURE — 85610 PROTHROMBIN TIME: CPT

## 2018-06-11 PROCEDURE — 85730 THROMBOPLASTIN TIME PARTIAL: CPT

## 2018-06-11 PROCEDURE — 99232 SBSQ HOSP IP/OBS MODERATE 35: CPT

## 2018-06-11 PROCEDURE — 84295 ASSAY OF SERUM SODIUM: CPT

## 2018-06-11 PROCEDURE — 74177 CT ABD & PELVIS W/CONTRAST: CPT

## 2018-06-11 PROCEDURE — 82728 ASSAY OF FERRITIN: CPT

## 2018-06-11 PROCEDURE — 99285 EMERGENCY DEPT VISIT HI MDM: CPT | Mod: 25

## 2018-06-11 PROCEDURE — 96374 THER/PROPH/DIAG INJ IV PUSH: CPT

## 2018-06-11 PROCEDURE — 93970 EXTREMITY STUDY: CPT

## 2018-06-11 PROCEDURE — 73030 X-RAY EXAM OF SHOULDER: CPT

## 2018-06-11 PROCEDURE — 80076 HEPATIC FUNCTION PANEL: CPT

## 2018-06-11 PROCEDURE — 72170 X-RAY EXAM OF PELVIS: CPT

## 2018-06-11 PROCEDURE — 83605 ASSAY OF LACTIC ACID: CPT

## 2018-06-11 PROCEDURE — 71045 X-RAY EXAM CHEST 1 VIEW: CPT

## 2018-06-11 PROCEDURE — 85027 COMPLETE CBC AUTOMATED: CPT

## 2018-06-11 PROCEDURE — 82607 VITAMIN B-12: CPT

## 2018-06-11 PROCEDURE — 82746 ASSAY OF FOLIC ACID SERUM: CPT

## 2018-06-11 PROCEDURE — 81001 URINALYSIS AUTO W/SCOPE: CPT

## 2018-06-11 PROCEDURE — 82435 ASSAY OF BLOOD CHLORIDE: CPT

## 2018-06-11 PROCEDURE — 73620 X-RAY EXAM OF FOOT: CPT

## 2018-06-11 PROCEDURE — 73070 X-RAY EXAM OF ELBOW: CPT

## 2018-06-11 PROCEDURE — 97162 PT EVAL MOD COMPLEX 30 MIN: CPT

## 2018-06-11 PROCEDURE — 93005 ELECTROCARDIOGRAM TRACING: CPT

## 2018-06-11 PROCEDURE — 97530 THERAPEUTIC ACTIVITIES: CPT

## 2018-06-11 PROCEDURE — 83540 ASSAY OF IRON: CPT

## 2018-06-11 PROCEDURE — 82962 GLUCOSE BLOOD TEST: CPT

## 2018-06-11 PROCEDURE — 73110 X-RAY EXAM OF WRIST: CPT

## 2018-06-11 PROCEDURE — 82947 ASSAY GLUCOSE BLOOD QUANT: CPT

## 2018-06-11 PROCEDURE — 83550 IRON BINDING TEST: CPT

## 2018-06-11 PROCEDURE — 70450 CT HEAD/BRAIN W/O DYE: CPT

## 2018-06-11 PROCEDURE — 83036 HEMOGLOBIN GLYCOSYLATED A1C: CPT

## 2018-06-11 PROCEDURE — 80048 BASIC METABOLIC PNL TOTAL CA: CPT

## 2018-06-11 PROCEDURE — 82977 ASSAY OF GGT: CPT

## 2018-06-11 RX ORDER — METFORMIN HYDROCHLORIDE 850 MG/1
1 TABLET ORAL
Qty: 60 | Refills: 0 | OUTPATIENT
Start: 2018-06-11 | End: 2018-07-10

## 2018-06-11 RX ORDER — OXYCODONE HYDROCHLORIDE 5 MG/1
5 TABLET ORAL ONCE
Qty: 0 | Refills: 0 | Status: DISCONTINUED | OUTPATIENT
Start: 2018-06-11 | End: 2018-06-11

## 2018-06-11 RX ORDER — LOSARTAN POTASSIUM 100 MG/1
1 TABLET, FILM COATED ORAL
Qty: 14 | Refills: 0 | OUTPATIENT
Start: 2018-06-11 | End: 2018-06-24

## 2018-06-11 RX ORDER — NYSTATIN CREAM 100000 [USP'U]/G
1 CREAM TOPICAL
Qty: 1 | Refills: 0 | OUTPATIENT
Start: 2018-06-11 | End: 2018-06-24

## 2018-06-11 RX ORDER — LISINOPRIL 2.5 MG/1
1 TABLET ORAL
Qty: 30 | Refills: 0 | OUTPATIENT
Start: 2018-06-11 | End: 2018-07-10

## 2018-06-11 RX ORDER — METOPROLOL TARTRATE 50 MG
1 TABLET ORAL
Qty: 60 | Refills: 0 | OUTPATIENT
Start: 2018-06-11 | End: 2018-07-10

## 2018-06-11 RX ORDER — ACETAMINOPHEN 500 MG
650 TABLET ORAL ONCE
Qty: 0 | Refills: 0 | Status: COMPLETED | OUTPATIENT
Start: 2018-06-11 | End: 2018-06-11

## 2018-06-11 RX ORDER — ASCORBIC ACID 60 MG
1 TABLET,CHEWABLE ORAL
Qty: 0 | Refills: 0 | COMMUNITY
Start: 2018-06-11

## 2018-06-11 RX ADMIN — OXYCODONE HYDROCHLORIDE 5 MILLIGRAM(S): 5 TABLET ORAL at 17:20

## 2018-06-11 RX ADMIN — Medication 500 MILLIGRAM(S): at 13:20

## 2018-06-11 RX ADMIN — Medication 1 TABLET(S): at 13:20

## 2018-06-11 RX ADMIN — Medication 25 MILLIGRAM(S): at 05:27

## 2018-06-11 RX ADMIN — PIPERACILLIN AND TAZOBACTAM 25 GRAM(S): 4; .5 INJECTION, POWDER, LYOPHILIZED, FOR SOLUTION INTRAVENOUS at 05:27

## 2018-06-11 RX ADMIN — PANTOPRAZOLE SODIUM 40 MILLIGRAM(S): 20 TABLET, DELAYED RELEASE ORAL at 05:27

## 2018-06-11 RX ADMIN — Medication 650 MILLIGRAM(S): at 17:20

## 2018-06-11 RX ADMIN — NYSTATIN CREAM 1 APPLICATION(S): 100000 CREAM TOPICAL at 05:27

## 2018-06-11 RX ADMIN — LISINOPRIL 5 MILLIGRAM(S): 2.5 TABLET ORAL at 05:27

## 2018-06-11 RX ADMIN — OXYCODONE HYDROCHLORIDE 5 MILLIGRAM(S): 5 TABLET ORAL at 16:24

## 2018-06-11 RX ADMIN — Medication 650 MILLIGRAM(S): at 16:24

## 2018-06-11 NOTE — DISCHARGE NOTE ADULT - PLAN OF CARE
Tolerate meals Condition improved  Encourage favorite foods. Continue with Ensure Left: Cleanse with normal saline, Pat dry, apply CAVILON to periwound skin  Medihoney to wound,  Cover with gauze and Secure with cling roll gauze daily of Ischium:  Cleanse with normal saline, Pat dry. CAVILON to periwound  pack with Aquacel.  Cover with gauze & Tegaderm daily HgA1C this admission -8.1   Make sure you get your HgA1c checked every three months.  If you take oral diabetes medications, check your blood glucose two times a day.  If you take insulin, check your blood glucose before meals and at bedtime.  It's important not to skip any meals.  Keep a log of your blood glucose results and always take it with you to your doctor appointments.  Keep a list of your current medications including injectables and over the counter medications and bring this medication list with you to all your doctor appointments.  If you have not seen your ophthalmologist this year call for appointment.  Check your feet daily for redness, sores, or openings. Do not self treat. If no improvement in two days call your primary care physician for an appointment.  Low blood sugar (hypoglycemia) is a blood sugar below 70mg/dl. Check your blood sugar if you feel signs/symptoms of hypoglycemia. If your blood sugar is below 70 take 15 grams of carbohydrates (ex 4 oz of apple juice, 3-4 glucose tablets, or 4-6 oz of regular soda) wait 15 minutes and repeat blood sugar to make sure it comes up above 70.  If your blood sugar is above 70 and you are due for a meal, have a meal.  If you are not due for a meal have a snack.  This snack helps keeps your blood sugar at a safe range.

## 2018-06-11 NOTE — DISCHARGE NOTE ADULT - HOSPITAL COURSE
68y/o Femal with h/o  htn, dm 2, multiple falls, now wheel chair bound, bed bound, functional quadriplegia  s/p  h/o traumatic fall, from window, 20yrs  ago  now  with ftt/ sacral  decubiti.   on zosyn, last day is Monday(6/11)   wound care  and ID  , saw   pt  anemia, gi  called  dvt ppx 66y/o Femal with h/o  htn, dm 2, multiple falls, now wheel chair bound, bed bound, functional quadriplegia  s/p  h/o traumatic fall, from window, 20yrs  ago,  undocumented  pt  from [facility]  dc  home  today/ socail worker  saw  pt  now  with ftt/ sacral  decubiti/ c/c pedal edema./ severe  protein calorie  malnutrition/  distal  uilnar fx.  dm  2.    neuropathic  left shoulder,  seen by ortho/ spine, , no intervention   brace  for old  spine comp fx   on zosyn, last day  Monday(6/11)   wound care  and ID  , saw   pt  anemia, gi. card  saw pt 66y/o Femal with h/o  htn, dm 2, multiple falls, now wheel chair bound, bed bound, functional quadriplegia  s/p  h/o traumatic fall, from window, 20yrs  ago,  undocumented  pt  from [facility]  dc  home  today/ socail worker  saw  pt  now  with ftt/ sacral  decubiti/ c/c pedal edema./ severe  protein calorie  malnutrition/  distal  uilnar fx.  dm  2.    neuropathic  left shoulder,  seen by ortho/ spine, , no intervention   brace  for old  spine comp fx   seen by ID- completed course of zosyn-- last day  Monday(6/11)  c/w wound care to Lt ankle and Ischium.  Anemia- seen by GI- tolerating meals. card saw pt

## 2018-06-11 NOTE — DISCHARGE NOTE ADULT - CARE PLAN
Principal Discharge DX:	Failure to thrive in adult  Goal:	Tolerate meals  Assessment and plan of treatment:	Condition improved  Encourage favorite foods. Continue with Ensure  Secondary Diagnosis:	Ankle wound  Assessment and plan of treatment:	Left: Cleanse with normal saline, Pat dry, apply CAVILON to periwound skin  Medihoney to wound,  Cover with gauze and Secure with cling roll gauze daily  Secondary Diagnosis:	Wound  Assessment and plan of treatment:	of Ischium:  Cleanse with normal saline, Pat dry. CAVILON to periwound  pack with Aquacel.  Cover with gauze & Tegaderm daily  Secondary Diagnosis:	Type 2 diabetes mellitus without complication, without long-term current use of insulin  Assessment and plan of treatment:	HgA1C this admission -8.1   Make sure you get your HgA1c checked every three months.  If you take oral diabetes medications, check your blood glucose two times a day.  If you take insulin, check your blood glucose before meals and at bedtime.  It's important not to skip any meals.  Keep a log of your blood glucose results and always take it with you to your doctor appointments.  Keep a list of your current medications including injectables and over the counter medications and bring this medication list with you to all your doctor appointments.  If you have not seen your ophthalmologist this year call for appointment.  Check your feet daily for redness, sores, or openings. Do not self treat. If no improvement in two days call your primary care physician for an appointment.  Low blood sugar (hypoglycemia) is a blood sugar below 70mg/dl. Check your blood sugar if you feel signs/symptoms of hypoglycemia. If your blood sugar is below 70 take 15 grams of carbohydrates (ex 4 oz of apple juice, 3-4 glucose tablets, or 4-6 oz of regular soda) wait 15 minutes and repeat blood sugar to make sure it comes up above 70.  If your blood sugar is above 70 and you are due for a meal, have a meal.  If you are not due for a meal have a snack.  This snack helps keeps your blood sugar at a safe range.

## 2018-06-11 NOTE — PROGRESS NOTE ADULT - SUBJECTIVE AND OBJECTIVE BOX
CC: f/u for leukocytosis    Patient reports feeling OK, but is pleasantly confused     REVIEW OF SYSTEMS:  All other review of systems negative (Comprehensive ROS)      T(F): 97.7 (18 @ 11:51), Max: 98.3 (18 @ 17:54)  HR: 88 (18 @ 11:51)  BP: 112/55 (18 @ 11:51)  RR: 18 (18 @ 11:51)  SpO2: 97% (18 @ 11:51)  Wt(kg): --    PHYSICAL EXAM:  General: alert, no acute distress  Eyes:  anicteric, no conjunctival injection, no discharge  Oropharynx: no lesions or injection 	  Neck: supple, without adenopathy  Lungs: clear to auscultation  Heart: regular rate and rhythm; no murmur, rubs or gallops  Abdomen: soft, nondistended, nontender, without mass or organomegaly  Skin: sacral. buttucks area with diffuse skin excoriation, erythema, and decub ulcers   Extremities: no clubbing, cyanosis, or edema  Neurologic: alert, oriented, moves all extremities    LAB RESULTS:                        8.9    9.20  )-----------( 478      ( 2018 09:27 )             28.8         138  |  104  |  36<H>  ----------------------------<  122<H>  4.0   |  26  |  0.54    Ca    9.0      2018 07:10    TPro  6.2  /  Alb  2.6<L>  /  TBili  0.1<L>  /  DBili  <0.1  /  AST  11  /  ALT  10  /  AlkPhos  181<H>      LIVER FUNCTIONS - ( 2018 09:16 )  Alb: x     / Pro: x     / ALK PHOS: x     / ALT: x     / AST: x     / GGT: 22 U/L       Urinalysis Basic - ( 2018 03:39 )    Color: Yellow / Appearance: Clear / S.025 / pH: x  Gluc: x / Ketone: Trace  / Bili: Negative / Urobili: Negative   Blood: x / Protein: 30 mg/dL / Nitrite: Negative   Leuk Esterase: Negative / RBC: x / WBC 3-5 /HPF   Sq Epi: x / Non Sq Epi: OCC /HPF / Bacteria: x      MICROBIOLOGY:  RECENT CULTURES:      RADIOLOGY REVIEWED:        Antimicrobials Day #    piperacillin/tazobactam IVPB. 3.375 Gram(s) IV Intermittent every 8 hours    Other Medications Reviewed
Patient is a 67y old  Female who presented with a chief complaint of ftt (2018 10:40)    INTERVAL HPI/OVERNIGHT EVENTS:    MEDICATIONS  (STANDING):  dextrose 5%. 1000 milliLiter(s) (50 mL/Hr) IV Continuous <Continuous>  dextrose 50% Injectable 12.5 Gram(s) IV Push once  dextrose 50% Injectable 25 Gram(s) IV Push once  dextrose 50% Injectable 25 Gram(s) IV Push once  enoxaparin Injectable 40 milliGRAM(s) SubCutaneous daily  insulin lispro (HumaLOG) corrective regimen sliding scale   SubCutaneous three times a day before meals  insulin lispro (HumaLOG) corrective regimen sliding scale   SubCutaneous at bedtime  lisinopril 5 milliGRAM(s) Oral daily  metoprolol tartrate 25 milliGRAM(s) Oral two times a day  nystatin Cream 1 Application(s) Topical two times a day  pantoprazole    Tablet 40 milliGRAM(s) Oral before breakfast  piperacillin/tazobactam IVPB. 3.375 Gram(s) IV Intermittent every 8 hours    MEDICATIONS  (PRN):  dextrose 40% Gel 15 Gram(s) Oral once PRN Blood Glucose LESS THAN 70 milliGRAM(s)/deciliter  glucagon  Injectable 1 milliGRAM(s) IntraMuscular once PRN Glucose LESS THAN 70 milligrams/deciliter    Allergies  No Known Allergies    Review of Systems:  General:  no fever or chills  CV:  No pain, palpitations, hypo/hypertension  Resp:  No dyspnea, cough, tachypnea, wheezing  GI:  see HPI  :  no hematuria +incontinent  +groin area skin breakdown  Muscle:  +spinal injury s/p trauma +LE weakness   Neuro:  see HPI  Psych:  No history of depression or mood d/o  Endocrine:  No polyuria, polydypsia  Heme:  No petechiae, ecchymosis, easy bruisability  Skin:  +decubiti    Vital Signs Last 24 Hrs  T(C): 36.7 (2018 05:25), Max: 36.8 (2018 17:54)  T(F): 98.1 (2018 05:25), Max: 98.3 (2018 17:54)  HR: 78 (2018 05:25) (71 - 107)  BP: 128/81 (2018 05:25) (114/71 - 151/79)  BP(mean): --  RR: 18 (2018 05:25) (18 - 18)  SpO2: 98% (2018 05:25) (95% - 98%)    PHYSICAL EXAM:  Constitutional: chronically ill appearing female lying in bed, granddaughter at bedside  Neck: No LAD, supple  Respiratory: b/l air entry no wheeze  Cardiovascular: S1 and S2, RRR, no M  Gastrointestinal: BS+, soft, obese +multiple areas of denuded skin with erythema in inguinal areas and under pannus - tender to touch and friable no HSM, mass pulsations, no rebound guarding or rigidity negative Zaidi's sign  Extremities: +left ankle wound with dressing  +healed right heel wound +atrophy  Vascular: +palpable pulses, warm to touch  Neurological: A/O x 3, no focal asymmetry  Psychiatric: Normal mood, normal affect  Skin: see above - could not assess sacrum as pt c/o pain and not able to turn pt on stretcher    LABS:                      9.8    10.6  )-----------( 526      ( 2018 10:05 )             30.7   Hemoglobin: 9.8 g/dL <L> [11.5 - 15.5] (18 @ 10:05)  Hemoglobin: 11.2 g/dL <L> [11.5 - 15.5] (18 @ 03:11)        138  |  104  |  36<H>  ----------------------------<  122<H>  4.0   |  26  |  0.54    Ca    9.0      2018 07:10    TPro  6.2  /  Alb  2.6<L>  /  TBili  0.1<L>  /  DBili  <0.1  /  AST  11  /  ALT  10  /  AlkPhos  181<H>      PT/INR - ( 2018 03:11 )   PT: 11.3 sec;   INR: 1.04 ratio    PTT - ( 2018 03:11 )  PTT:34.9 sec    Urinalysis Basic - ( 2018 03:39 )  Color: Yellow / Appearance: Clear / S.025 / pH: x  Gluc: x / Ketone: Trace  / Bili: Negative / Urobili: Negative   Blood: x / Protein: 30 mg/dL / Nitrite: Negative   Leuk Esterase: Negative / RBC: x / WBC 3-5 /HPF   Sq Epi: x / Non Sq Epi: OCC /HPF / Bacteria: x    RADIOLOGY & ADDITIONAL TESTS:      < from: CT Abdomen and Pelvis w/ IV Cont (18 @ 06:21) >  INTERPRETATION:  CLINICAL INFORMATION: Status post fall. Back pain.    COMPARISON: None.    PROCEDURE:   CT of the Abdomen and Pelvis was performed with intravenous contrast.   Intravenous contrast: 90 ml Omnipaque 350. 10 ml discarded.  Oral contrast: None.  Sagittal and coronal reformats were performed.    FINDINGS:    LOWER CHEST: Calcific atherosclerosis of thoracic aorta.    LIVER: Within normal limits.  BILE DUCTS: Prominent CBD measures up to 8 to 9 mm in diameter.  GALLBLADDER: Distended.  SPLEEN: Within normal limits.  PANCREAS: Within normal limits.  ADRENALS: Within normal limits.    KIDNEYS/URETERS: Few subcentimeter hypodense structures, too small to   characterize. No hydronephrosis.  BLADDER: Bladder wall thickening due to underdistention versus cystitis.  REPRODUCTIVE ORGANS: The uterus and adnexa are within normal limits.    BOWEL: No bowel obstruction. Appendix within normal limits.  PERITONEUM: No free air..  VESSELS:  Calcific atherosclerosis of aorta. Multifocal irregular   atheromatous plaque, notably at the origin of the celiac axis indicating   ulcerated plaque.  RETROPERITONEUM: No lymphadenopathy.    ABDOMINAL WALL: Within normal limits.    BONES: Age-indeterminate moderate to severe compressive deformity of L1   vertebra with bony retropulsion. Multiple sclerotic foci likely reflect   bony islands in thevertebral bodies noted. Diffuse osteopenia and   multilevel spondylosis.  Sacral decubitus ulcer.    IMPRESSION:     Linear soft tissue defect extending to the sacrum, consistent with   history of sacral decubitus ulcer.    Age-indeterminate severe L1 compression fracture deformity with bony   retropulsion. Correlate with site of pain.    Ulcerated atheromatous plaque with notable ulceration at the origin of   the celiac axis.    Bladder wall thickening due to underdistention versus cystitis. Correlate   with urinalysis.    Mild CBD dilatation without evidence of choledocholithiasis. Correlate   with alkaline phosphatase/LFT's.
REVIEW OF SYSTEMS:  CONSTITUTIONAL: No weakness,  no   fevers      RESPIRATORY:  No    shortness of breath  , no  wheezing  CARDIOVASCULAR: No chest pain,   no  palpitations, no  cough  GASTROINTESTINAL: No abdominal  pain, no  vomiting, no  diarrhea  NEUROLOGICAL: No  focal  weakness    MEDICATIONS  (STANDING):  dextrose 5%. 1000 milliLiter(s) (50 mL/Hr) IV Continuous <Continuous>  dextrose 50% Injectable 12.5 Gram(s) IV Push once  dextrose 50% Injectable 25 Gram(s) IV Push once  dextrose 50% Injectable 25 Gram(s) IV Push once  enoxaparin Injectable 40 milliGRAM(s) SubCutaneous daily  insulin lispro (HumaLOG) corrective regimen sliding scale   SubCutaneous three times a day before meals  insulin lispro (HumaLOG) corrective regimen sliding scale   SubCutaneous at bedtime  lisinopril 5 milliGRAM(s) Oral daily  metoprolol tartrate 25 milliGRAM(s) Oral two times a day  nystatin Cream 1 Application(s) Topical two times a day  pantoprazole    Tablet 40 milliGRAM(s) Oral before breakfast  piperacillin/tazobactam IVPB. 3.375 Gram(s) IV Intermittent every 8 hours    MEDICATIONS  (PRN):  dextrose 40% Gel 15 Gram(s) Oral once PRN Blood Glucose LESS THAN 70 milliGRAM(s)/deciliter  glucagon  Injectable 1 milliGRAM(s) IntraMuscular once PRN Glucose LESS THAN 70 milligrams/deciliter  meclizine 25 milliGRAM(s) Oral daily PRN Dizziness  morphine  - Injectable 2 milliGRAM(s) IV Push daily PRN Moderate Pain (4 - 6)      Vital Signs Last 24 Hrs  T(C): 36.5 (09 Jun 2018 05:21), Max: 36.8 (08 Jun 2018 21:17)  T(F): 97.7 (09 Jun 2018 05:21), Max: 98.3 (08 Jun 2018 21:17)  HR: 96 (09 Jun 2018 05:21) (88 - 99)  BP: 117/64 (09 Jun 2018 05:21) (111/60 - 117/64)  BP(mean): --  RR: 18 (09 Jun 2018 05:21) (18 - 18)  SpO2: 96% (09 Jun 2018 05:21) (96% - 99%)  CAPILLARY BLOOD GLUCOSE      POCT Blood Glucose.: 133 mg/dL (09 Jun 2018 08:15)  POCT Blood Glucose.: 381 mg/dL (08 Jun 2018 22:34)  POCT Blood Glucose.: 190 mg/dL (08 Jun 2018 17:52)  POCT Blood Glucose.: 201 mg/dL (08 Jun 2018 12:17)    I&O's Summary    08 Jun 2018 07:01  -  09 Jun 2018 07:00  --------------------------------------------------------  IN: 1020 mL / OUT: 0 mL / NET: 1020 mL        PHYSICAL EXAM:  HEAD:  Atraumatic, Normocephalic  NECK: Supple, No   JVD  CHEST/LUNG:   no     rales,     no,    rhonchi  HEART: Regular rate and rhythm;         murmur  ABDOMEN: Soft, Nontender, ;   EXTREMITIES:        edema  NEUROLOGY:  alert    LABS:                        8.9    9.20  )-----------( 478      ( 08 Jun 2018 09:27 )             28.8     06-08    138  |  104  |  36<H>  ----------------------------<  122<H>  4.0   |  26  |  0.54    Ca    9.0      08 Jun 2018 07:10    TPro  6.2  /  Alb  2.5<L>  /  TBili  0.2  /  DBili  <0.1  /  AST  12  /  ALT  9<L>  /  AlkPhos  165<H>  06-09                            Consultant(s) Notes Reviewed:      Care Discussed with Consultants/Other Providers:
- Patient seen and examined.  - In summary, patient is a 67 year old woman who presented with ftt (2018 10:40)  - Today, patient is without complaints.         *****MEDICATIONS:    MEDICATIONS  (STANDING):  ascorbic acid 500 milliGRAM(s) Oral daily  dextrose 5%. 1000 milliLiter(s) (50 mL/Hr) IV Continuous <Continuous>  dextrose 50% Injectable 12.5 Gram(s) IV Push once  dextrose 50% Injectable 25 Gram(s) IV Push once  dextrose 50% Injectable 25 Gram(s) IV Push once  enoxaparin Injectable 40 milliGRAM(s) SubCutaneous daily  insulin lispro (HumaLOG) corrective regimen sliding scale   SubCutaneous three times a day before meals  insulin lispro (HumaLOG) corrective regimen sliding scale   SubCutaneous at bedtime  lisinopril 5 milliGRAM(s) Oral daily  metoprolol tartrate 25 milliGRAM(s) Oral two times a day  multivitamin 1 Tablet(s) Oral daily  nystatin Cream 1 Application(s) Topical two times a day  pantoprazole    Tablet 40 milliGRAM(s) Oral before breakfast  piperacillin/tazobactam IVPB. 3.375 Gram(s) IV Intermittent every 8 hours    MEDICATIONS  (PRN):  dextrose 40% Gel 15 Gram(s) Oral once PRN Blood Glucose LESS THAN 70 milliGRAM(s)/deciliter  glucagon  Injectable 1 milliGRAM(s) IntraMuscular once PRN Glucose LESS THAN 70 milligrams/deciliter  meclizine 25 milliGRAM(s) Oral daily PRN Dizziness  morphine  - Injectable 2 milliGRAM(s) IV Push daily PRN Moderate Pain (4 - 6)             ***** REVIEW OF SYSTEM:  GEN: no fever, no chills, no pain  RESP: no SOB, no cough, no sputum  CVS: no chest pain, no palpitations, no edema  GI: no abdominal pain, no nausea, no vomiting, no constipation, no diarrhea  : no dysuria, no frequency  NEURO: no headache, no dizziness  PSYCH: no depression, not anxious  Derm : no itching, no rash         ***** VITAL SIGNS:    T(F): 97.8 (18 @ 05:00), Max: 98 (06-10-18 @ 20:30)  HR: 89 (18 @ 05:00) (87 - 102)  BP: 113/67 (18 @ 05:00) (105/68 - 124/77)  RR: 17 (18 @ 05:00) (17 - 18)  SpO2: 96% (18 @ 05:00) (96% - 100%)  Wt(kg): --  ,   I&O's Summary    10 Avel 2018 07:01  -  2018 07:00  --------------------------------------------------------  IN: 1205 mL / OUT: 0 mL / NET: 1205 mL                   *****PHYSICAL EXAM:  GEN: A&O X 3 , NAD , comfortable  HEENT: NCAT, EOMI, MMM, no icterus  NECK: Supple, No JVD  CVS: S1S2 , regular , No M/R/G appreciated  PULM: CTA B/L,  no W/R/R appreciated  ABD.: soft. non tender, non distended,  bowel sounds present  Extrem: intact pulses , no edema noted  Derm: No rash or ecchymosis noted  PSYCH: normal mood, no depression, not anxious         *****LAB AND IMAGIN.9    12.34 )-----------( 520      ( 10 Avel 2018 11:54 )             32.3               06-11    139  |  103  |  18  ----------------------------<  150<H>  4.1   |  25  |  0.42<L>    Ca    8.5      2018 07:43           [All pertinent recent Imaging/Reports reviewed]  < from: Transthoracic Echocardiogram (18 @ 15:40) >  Conclusions:  1. Mitral annular calcification, otherwise normal mitral  valve. Minimal mitral regurgitation.  2. Calcified trileaflet aortic valve with normal opening.  3. Hyperdynamic left ventricularsystolic function.  4. Normal right ventricular size and function.  5. Estimated right ventricular systolic pressure equals 33  mm Hg, assuming right atrial pressure equals 8 mm Hg,  consistent with normal pulmonary pressures.    < end of copied text >         *****A S S E S S M E N T   A N D   P L A N :  67F Hx htn, dm, multiple falls from wheel chair, FTT   wheelchair bound/functional quadriplegia s/p prior trauma  BP stable  cont current meds  lasix for O>I  LE doppler no DVT  wound care  abx per ID  echo noted  PT  DCP    __________________________  AIDA Arboleda D.O.
- Patient seen and examined.  - In summary, patient is a 67 year old woman who presented with ftt (2018 10:40)  - Today, patient is without complaints.         *****MEDICATIONS:    MEDICATIONS  (STANDING):  ascorbic acid 500 milliGRAM(s) Oral daily  dextrose 5%. 1000 milliLiter(s) (50 mL/Hr) IV Continuous <Continuous>  dextrose 50% Injectable 12.5 Gram(s) IV Push once  dextrose 50% Injectable 25 Gram(s) IV Push once  dextrose 50% Injectable 25 Gram(s) IV Push once  enoxaparin Injectable 40 milliGRAM(s) SubCutaneous daily  insulin lispro (HumaLOG) corrective regimen sliding scale   SubCutaneous three times a day before meals  insulin lispro (HumaLOG) corrective regimen sliding scale   SubCutaneous at bedtime  lisinopril 5 milliGRAM(s) Oral daily  metoprolol tartrate 25 milliGRAM(s) Oral two times a day  multivitamin 1 Tablet(s) Oral daily  nystatin Cream 1 Application(s) Topical two times a day  pantoprazole    Tablet 40 milliGRAM(s) Oral before breakfast  piperacillin/tazobactam IVPB. 3.375 Gram(s) IV Intermittent every 8 hours    MEDICATIONS  (PRN):  dextrose 40% Gel 15 Gram(s) Oral once PRN Blood Glucose LESS THAN 70 milliGRAM(s)/deciliter  glucagon  Injectable 1 milliGRAM(s) IntraMuscular once PRN Glucose LESS THAN 70 milligrams/deciliter  meclizine 25 milliGRAM(s) Oral daily PRN Dizziness  morphine  - Injectable 2 milliGRAM(s) IV Push daily PRN Moderate Pain (4 - 6)           ***** REVIEW OF SYSTEM:  GEN: no fever, no chills, no pain  RESP: no SOB, no cough, no sputum  CVS: no chest pain, no palpitations, no edema  GI: no abdominal pain, no nausea, no vomiting, no constipation, no diarrhea  : no dysuria, no frequency  NEURO: no headache, no dizziness  PSYCH: no depression, not anxious  Derm : no itching, no rash         ***** VITAL SIGNS:    T(F): 98.1 (06-10-18 @ 06:07), Max: 98.4 (18 @ 20:01)  HR: 88 (06-10-18 @ 06:07) (88 - 112)  BP: 118/64 (06-10-18 @ 06:07) (96/58 - 118/64)  RR: 18 (06-10-18 @ 06:07) (18 - 18)  SpO2: 98% (06-10-18 @ 06:07) (95% - 98%)  Wt(kg): --  ,   I&O's Summary    2018 07:01  -  10 Avel 2018 07:00  --------------------------------------------------------  IN: 920 mL / OUT: 0 mL / NET: 920 mL             *****PHYSICAL EXAM:  GEN: A&O X 3 , NAD , comfortable  HEENT: NCAT, EOMI, MMM, no icterus  NECK: Supple, No JVD  CVS: S1S2 , regular , No M/R/G appreciated  PULM: CTA B/L,  no W/R/R appreciated  ABD.: soft. non tender, non distended,  bowel sounds present  Extrem: intact pulses , no edema noted  Derm: No rash or ecchymosis noted  PSYCH: normal mood, no depression, not anxious         *****LAB AND IMAGIN.7    11.39 )-----------( 501      ( 2018 08:25 )             28.4                   TPro  6.2  /  Alb  2.5<L>  /  TBili  0.2  /  DBili  <0.1  /  AST  12  /  ALT  9<L>  /  AlkPhos  165<H>             [All pertinent recent Imaging/Reports reviewed]  < from: Transthoracic Echocardiogram (18 @ 15:40) >  Conclusions:  1. Mitral annular calcification, otherwise normal mitral  valve. Minimal mitral regurgitation.  2. Calcified trileaflet aortic valve with normal opening.  3. Hyperdynamic left ventricularsystolic function.  4. Normal right ventricular size and function.  5. Estimated right ventricular systolic pressure equals 33  mm Hg, assuming right atrial pressure equals 8 mm Hg,  consistent with normal pulmonary pressures.    < end of copied text >         *****A S S E S S M E N T   A N D   P L A N :  67F Hx htn, dm, multiple falls from wheel chair, FTT   wheelchair bound/functional quadriplegia s/p prior trauma  BP stable  cont current meds  lasix for O>I  LE doppler no DVT  wound care  abx per ID  echo noted  PT      __________________________  AIDA Arboleda D.O.
- Patient seen and examined.  - In summary, patient is a 67 year old woman who presented with ftt (2018 10:40)  - Today, patient is without complaints.         *****MEDICATIONS:    MEDICATIONS  (STANDING):  dextrose 5%. 1000 milliLiter(s) (50 mL/Hr) IV Continuous <Continuous>  dextrose 50% Injectable 12.5 Gram(s) IV Push once  dextrose 50% Injectable 25 Gram(s) IV Push once  dextrose 50% Injectable 25 Gram(s) IV Push once  enoxaparin Injectable 40 milliGRAM(s) SubCutaneous daily  insulin lispro (HumaLOG) corrective regimen sliding scale   SubCutaneous three times a day before meals  insulin lispro (HumaLOG) corrective regimen sliding scale   SubCutaneous at bedtime  lisinopril 5 milliGRAM(s) Oral daily  metoprolol tartrate 25 milliGRAM(s) Oral two times a day  nystatin Cream 1 Application(s) Topical two times a day  pantoprazole    Tablet 40 milliGRAM(s) Oral before breakfast  piperacillin/tazobactam IVPB. 3.375 Gram(s) IV Intermittent every 8 hours    MEDICATIONS  (PRN):  dextrose 40% Gel 15 Gram(s) Oral once PRN Blood Glucose LESS THAN 70 milliGRAM(s)/deciliter  glucagon  Injectable 1 milliGRAM(s) IntraMuscular once PRN Glucose LESS THAN 70 milligrams/deciliter           ***** REVIEW OF SYSTEM:  GEN: no fever, no chills, no pain  RESP: no SOB, no cough, no sputum  CVS: no chest pain, no palpitations, no edema  GI: no abdominal pain, no nausea, no vomiting, no constipation, no diarrhea  : no dysuria, no frequency  NEURO: no headache, no dizziness  PSYCH: no depression, not anxious  Derm : no itching, no rash         ***** VITAL SIGNS:  T(F): 98.1 (18 @ 05:25), Max: 98.3 (18 @ 17:54)  HR: 78 (18 @ 05:25) (71 - 107)  BP: 128/81 (18 @ 05:25) (114/71 - 128/81)  RR: 18 (18 @ 05:25) (18 - 18)  SpO2: 98% (06-08-18 @ 05:25) (95% - 98%)  Wt(kg): --  ,   I&O's Summary    2018 07:01  -  2018 07:00  --------------------------------------------------------  IN: 440 mL / OUT: 0 mL / NET: 440 mL             *****PHYSICAL EXAM:  GEN: A&O X 3 , NAD , comfortable  HEENT: NCAT, EOMI, MMM, no icterus  NECK: Supple, No JVD  CVS: S1S2 , regular , No M/R/G appreciated  PULM: CTA B/L,  no W/R/R appreciated  ABD.: soft. non tender, non distended,  bowel sounds present  Extrem: intact pulses , no edema noted  Derm: No rash or ecchymosis noted  PSYCH: normal mood, no depression, not anxious         *****LAB AND IMAGIN.9    9.20  )-----------( 478      ( 2018 09:27 )             28.8               06-08    138  |  104  |  36<H>  ----------------------------<  122<H>  4.0   |  26  |  0.54    Ca    9.0      2018 07:10    TPro  6.2  /  Alb  2.6<L>  /  TBili  0.1<L>  /  DBili  <0.1  /  AST  11  /  ALT  10  /  AlkPhos  181<H>  06-08    PT/INR - ( 2018 03:11 )   PT: 11.3 sec;   INR: 1.04 ratio         PTT - ( 2018 03:11 )  PTT:34.9 sec                   Urinalysis Basic - ( 2018 03:39 )    Color: Yellow / Appearance: Clear / S.025 / pH: x  Gluc: x / Ketone: Trace  / Bili: Negative / Urobili: Negative   Blood: x / Protein: 30 mg/dL / Nitrite: Negative   Leuk Esterase: Negative / RBC: x / WBC 3-5 /HPF   Sq Epi: x / Non Sq Epi: OCC /HPF / Bacteria: x      [All pertinent recent Imaging/Reports reviewed]         *****A S S E S S M E N T   A N D   P L A N :  67F Hx htn, dm, multiple falls from wheel chair, FTT   wheelchair bound/functional quadriplegia s/p prior trauma  started on BB, ACE  BP stable  lasix for O>I  check albumin  LE doppler  wound care  echo  PT  GI input noted      __________________________  A. DIONICIO Arboleda
- Patient seen and examined.  - In summary, patient is a 67 year old woman who presented with ftt (2018 10:40)  - Today, patient is without complaints.         *****MEDICATIONS:    MEDICATIONS  (STANDING):  dextrose 5%. 1000 milliLiter(s) (50 mL/Hr) IV Continuous <Continuous>  dextrose 50% Injectable 12.5 Gram(s) IV Push once  dextrose 50% Injectable 25 Gram(s) IV Push once  dextrose 50% Injectable 25 Gram(s) IV Push once  enoxaparin Injectable 40 milliGRAM(s) SubCutaneous daily  insulin lispro (HumaLOG) corrective regimen sliding scale   SubCutaneous three times a day before meals  insulin lispro (HumaLOG) corrective regimen sliding scale   SubCutaneous at bedtime  lisinopril 5 milliGRAM(s) Oral daily  metoprolol tartrate 25 milliGRAM(s) Oral two times a day  nystatin Cream 1 Application(s) Topical two times a day  pantoprazole    Tablet 40 milliGRAM(s) Oral before breakfast  piperacillin/tazobactam IVPB. 3.375 Gram(s) IV Intermittent every 8 hours    MEDICATIONS  (PRN):  dextrose 40% Gel 15 Gram(s) Oral once PRN Blood Glucose LESS THAN 70 milliGRAM(s)/deciliter  glucagon  Injectable 1 milliGRAM(s) IntraMuscular once PRN Glucose LESS THAN 70 milligrams/deciliter  meclizine 25 milliGRAM(s) Oral daily PRN Dizziness  morphine  - Injectable 2 milliGRAM(s) IV Push daily PRN Moderate Pain (4 - 6)             ***** REVIEW OF SYSTEM:  GEN: no fever, no chills, no pain  RESP: no SOB, no cough, no sputum  CVS: no chest pain, no palpitations, no edema  GI: no abdominal pain, no nausea, no vomiting, no constipation, no diarrhea  : no dysuria, no frequency  NEURO: no headache, no dizziness  PSYCH: no depression, not anxious  Derm : no itching, no rash         ***** VITAL SIGNS:    T(F): 97.7 (18 @ 05:21), Max: 98.3 (18 @ 21:17)  HR: 96 (18 @ 05:21) (88 - 99)  BP: 117/64 (18 @ 05:21) (111/60 - 117/64)  RR: 18 (18 @ 05:21) (18 - 18)  SpO2: 96% (18 @ 05:21) (96% - 99%)  Wt(kg): --  ,   I&O's Summary    2018 07:01  -  2018 07:00  --------------------------------------------------------  IN: 1020 mL / OUT: 0 mL / NET: 1020 mL                   *****PHYSICAL EXAM:  GEN: A&O X 3 , NAD , comfortable  HEENT: NCAT, EOMI, MMM, no icterus  NECK: Supple, No JVD  CVS: S1S2 , regular , No M/R/G appreciated  PULM: CTA B/L,  no W/R/R appreciated  ABD.: soft. non tender, non distended,  bowel sounds present  Extrem: intact pulses , no edema noted  Derm: No rash or ecchymosis noted  PSYCH: normal mood, no depression, not anxious         *****LAB AND IMAGIN.7    11.39 )-----------( 501      ( 2018 08:25 )             28.4                   138  |  104  |  36<H>  ----------------------------<  122<H>  4.0   |  26  |  0.54    Ca    9.0      2018 07:10    TPro  6.2  /  Alb  2.5<L>  /  TBili  0.2  /  DBili  <0.1  /  AST  12  /  ALT  9<L>  /  AlkPhos  165<H>                 [All pertinent recent Imaging/Reports reviewed]  < from: Transthoracic Echocardiogram (18 @ 15:40) >  Conclusions:  1. Mitral annular calcification, otherwise normal mitral  valve. Minimal mitral regurgitation.  2. Calcified trileaflet aortic valve with normal opening.  3. Hyperdynamic left ventricularsystolic function.  4. Normal right ventricular size and function.  5. Estimated right ventricular systolic pressure equals 33  mm Hg, assuming right atrial pressure equals 8 mm Hg,  consistent with normal pulmonary pressures.    < end of copied text >         *****A S S E S S M E N T   A N D   P L A N :  67F Hx htn, dm, multiple falls from wheel chair, FTT   wheelchair bound/functional quadriplegia s/p prior trauma  BP stable  cont current meds  lasix for O>I  LE doppler noted  wound care  echo noted  PT  f/u GI      __________________________  A. DIONICIO Arboleda
CC: f/u for leukocytosis    Patient reports feeling OK    REVIEW OF SYSTEMS:  All other review of systems negative (Comprehensive ROS)      Vital Signs Last 24 Hrs  T(C): 36.7 (10 Avel 2018 06:07), Max: 36.9 (2018 20:01)  T(F): 98.1 (10 Avel 2018 06:07), Max: 98.4 (2018 20:01)  HR: 88 (10 Avel 2018 06:07) (88 - 112)  BP: 118/64 (10 Avel 2018 06:07) (96/58 - 118/64)  BP(mean): --  RR: 18 (10 Avel 2018 06:07) (18 - 18)  SpO2: 98% (10 Avel 2018 06:07) (95% - 98%)    PHYSICAL EXAM:  General: alert, no acute distress  Eyes:  anicteric, no conjunctival injection, no discharge  Oropharynx: no lesions or injection 	  Neck: supple, without adenopathy  Lungs: clear to auscultation  Heart: regular rate and rhythm; no murmur, rubs or gallops  Abdomen: soft, nondistended, nontender, without mass or organomegaly  Skin: sacral. buttucks area with diffuse skin excoriation, erythema, and decub ulcers   Extremities: no clubbing, cyanosis, or edema  Neurologic: alert, oriented, moves all extremities    LAB RESULTS:                        8.7    11.39 )-----------( 501      ( 2018 08:25 )             28.4                           8.9    9.20  )-----------( 478      ( 2018 09:27 )             28.8     06-08    138  |  104  |  36<H>  ----------------------------<  122<H>  4.0   |  26  |  0.54    Ca    9.0      2018 07:10    TPro  6.2  /  Alb  2.6<L>  /  TBili  0.1<L>  /  DBili  <0.1  /  AST  11  /  ALT  10  /  AlkPhos  181<H>  06-08    LIVER FUNCTIONS - ( 2018 09:16 )  Alb: x     / Pro: x     / ALK PHOS: x     / ALT: x     / AST: x     / GGT: 22 U/L       Urinalysis Basic - ( 2018 03:39 )    Color: Yellow / Appearance: Clear / S.025 / pH: x  Gluc: x / Ketone: Trace  / Bili: Negative / Urobili: Negative   Blood: x / Protein: 30 mg/dL / Nitrite: Negative   Leuk Esterase: Negative / RBC: x / WBC 3-5 /HPF   Sq Epi: x / Non Sq Epi: OCC /HPF / Bacteria: x      MICROBIOLOGY:  RECENT CULTURES:      RADIOLOGY REVIEWED:        Antimicrobials Day #    piperacillin/tazobactam IVPB. 3.375 Gram(s) IV Intermittent every 8 hours    Other Medications Reviewed
CC: f/u for leukocytosis    Patient reports feeling OK, no fevers     REVIEW OF SYSTEMS:  All other review of systems negative (Comprehensive ROS)      Vital Signs Last 24 Hrs  T(C): 36.7 (2018 10:58), Max: 36.8 (2018 21:17)  T(F): 98.1 (2018 10:58), Max: 98.3 (2018 21:17)  HR: 94 (2018 10:58) (88 - 99)  BP: 96/58 (2018 10:58) (96/58 - 117/64)  BP(mean): --  RR: 18 (2018 10:58) (18 - 18)  SpO2: 96% (2018 10:58) (95% - 99%)    PHYSICAL EXAM:  General: alert, no acute distress  Eyes:  anicteric, no conjunctival injection, no discharge  Oropharynx: no lesions or injection 	  Neck: supple, without adenopathy  Lungs: clear to auscultation  Heart: regular rate and rhythm; no murmur, rubs or gallops  Abdomen: soft, nondistended, nontender, without mass or organomegaly  Skin: sacral. buttucks area with diffuse skin excoriation, erythema, and decub ulcers   Extremities: no clubbing, cyanosis, or edema  Neurologic: alert, oriented, moves all extremities    LAB RESULTS:                        8.9    9.20  )-----------( 478      ( 2018 09:27 )             28.8     06-08    138  |  104  |  36<H>  ----------------------------<  122<H>  4.0   |  26  |  0.54    Ca    9.0      2018 07:10    TPro  6.2  /  Alb  2.6<L>  /  TBili  0.1<L>  /  DBili  <0.1  /  AST  11  /  ALT  10  /  AlkPhos  181<H>  06-08    LIVER FUNCTIONS - ( 2018 09:16 )  Alb: x     / Pro: x     / ALK PHOS: x     / ALT: x     / AST: x     / GGT: 22 U/L       Urinalysis Basic - ( 2018 03:39 )    Color: Yellow / Appearance: Clear / S.025 / pH: x  Gluc: x / Ketone: Trace  / Bili: Negative / Urobili: Negative   Blood: x / Protein: 30 mg/dL / Nitrite: Negative   Leuk Esterase: Negative / RBC: x / WBC 3-5 /HPF   Sq Epi: x / Non Sq Epi: OCC /HPF / Bacteria: x      MICROBIOLOGY:  RECENT CULTURES:      RADIOLOGY REVIEWED:        Antimicrobials Day #    piperacillin/tazobactam IVPB. 3.375 Gram(s) IV Intermittent every 8 hours    Other Medications Reviewed
Patient admitted with 2 subacute ulnar fractures in different stages of healing, chronic shoulder and elbow deformities.    No acute orthopaedic intervention indicated.    Suggest  evaluation for potential social issues given the fractures in different stages of healing.
Patient is a 67y old  Female who presents with a chief complaint of Failure to thrive (11 Jun 2018 10:16)      Vascular Surgery Attending Progress Note    Interval HPI: pt w/o c/o     Medications:  ascorbic acid 500 milliGRAM(s) Oral daily  dextrose 40% Gel 15 Gram(s) Oral once PRN  dextrose 5%. 1000 milliLiter(s) IV Continuous <Continuous>  dextrose 50% Injectable 12.5 Gram(s) IV Push once  dextrose 50% Injectable 25 Gram(s) IV Push once  dextrose 50% Injectable 25 Gram(s) IV Push once  glucagon  Injectable 1 milliGRAM(s) IntraMuscular once PRN  insulin lispro (HumaLOG) corrective regimen sliding scale   SubCutaneous three times a day before meals  insulin lispro (HumaLOG) corrective regimen sliding scale   SubCutaneous at bedtime  lisinopril 5 milliGRAM(s) Oral daily  metoprolol tartrate 25 milliGRAM(s) Oral two times a day  multivitamin 1 Tablet(s) Oral daily  nystatin Cream 1 Application(s) Topical two times a day      Vital Signs Last 24 Hrs  T(C): 36.9 (11 Jun 2018 12:07), Max: 36.9 (11 Jun 2018 12:07)  T(F): 98.4 (11 Jun 2018 12:07), Max: 98.4 (11 Jun 2018 12:07)  HR: 82 (11 Jun 2018 12:07) (82 - 89)  BP: 102/60 (11 Jun 2018 12:07) (102/60 - 113/67)  BP(mean): --  RR: 18 (11 Jun 2018 12:07) (17 - 18)  SpO2: 97% (11 Jun 2018 12:07) (96% - 97%)  I&O's Summary    10 Avel 2018 07:01  -  11 Jun 2018 07:00  --------------------------------------------------------  IN: 1205 mL / OUT: 0 mL / NET: 1205 mL        Physical Exam:  Neuro  A&Ox3 VSS  Abd soft nt nd   Vascular:   stable     LABS:                        8.4    11.71 )-----------( 471      ( 11 Jun 2018 09:59 )             26.9     06-11    139  |  103  |  18  ----------------------------<  150<H>  4.1   |  25  |  0.42<L>    Ca    8.5      11 Jun 2018 07:43          JAYMIE LAGUNAS MD  [phone]
Patient is a 67y old  Female who presents with a chief complaint of ftt (07 Jun 2018 10:40)      INTERVAL HPI/OVERNIGHT EVENTS:  no GI events per report  +BMs  no abdominal pain  no nausea or vomiting    MEDICATIONS  (STANDING):  ascorbic acid 500 milliGRAM(s) Oral daily  dextrose 5%. 1000 milliLiter(s) (50 mL/Hr) IV Continuous <Continuous>  dextrose 50% Injectable 12.5 Gram(s) IV Push once  dextrose 50% Injectable 25 Gram(s) IV Push once  dextrose 50% Injectable 25 Gram(s) IV Push once  enoxaparin Injectable 40 milliGRAM(s) SubCutaneous daily  insulin lispro (HumaLOG) corrective regimen sliding scale   SubCutaneous three times a day before meals  insulin lispro (HumaLOG) corrective regimen sliding scale   SubCutaneous at bedtime  lisinopril 5 milliGRAM(s) Oral daily  metoprolol tartrate 25 milliGRAM(s) Oral two times a day  multivitamin 1 Tablet(s) Oral daily  nystatin Cream 1 Application(s) Topical two times a day  pantoprazole    Tablet 40 milliGRAM(s) Oral before breakfast  piperacillin/tazobactam IVPB. 3.375 Gram(s) IV Intermittent every 8 hours    MEDICATIONS  (PRN):  dextrose 40% Gel 15 Gram(s) Oral once PRN Blood Glucose LESS THAN 70 milliGRAM(s)/deciliter  glucagon  Injectable 1 milliGRAM(s) IntraMuscular once PRN Glucose LESS THAN 70 milligrams/deciliter  meclizine 25 milliGRAM(s) Oral daily PRN Dizziness  morphine  - Injectable 2 milliGRAM(s) IV Push daily PRN Moderate Pain (4 - 6)      Allergies  No Known Allergies    Review of Systems:  General:  no fever or chills  CV:  No pain, palpitations  Resp:  No dyspnea, cough, tachypnea, wheezing  :  no hematuria +incontinent  +groin area skin breakdown  Muscle:  +spinal injury s/p trauma +LE weakness   Heme:  No petechiae, ecchymosis, easy bruisability  Skin:  +decubiti    Vital Signs Last 24 Hrs  T(C): 36.6 (11 Jun 2018 05:00), Max: 36.7 (10 Avel 2018 20:30)  T(F): 97.8 (11 Jun 2018 05:00), Max: 98 (10 Avel 2018 20:30)  HR: 89 (11 Jun 2018 05:00) (87 - 102)  BP: 113/67 (11 Jun 2018 05:00) (105/68 - 124/77)  BP(mean): --  RR: 17 (11 Jun 2018 05:00) (17 - 18)  SpO2: 96% (11 Jun 2018 05:00) (96% - 100%)    PHYSICAL EXAM:  Constitutional: chronically ill appearing female lying in bed, granddaughter at bedside  Neck: No LAD, supple  Respiratory: b/l air entry no wheeze  Cardiovascular: S1 and S2, RRR, no M  Gastrointestinal: BS+, ND, soft, obese +multiple areas of denuded skin with erythema in inguinal areas and under pannus - tender to touch and friable no HSM, mass pulsations, no rebound guarding or rigidity negative Zaidi's sign  Extremities: +left ankle wound with dressing  +healed right heel wound +atrophy  Vascular: +palpable pulses, warm to touch  Neurological: A/O x 3, no focal asymmetry      LABS:                        9.9    12.34 )-----------( 520      ( 10 Avel 2018 11:54 )             32.3   Iron with Total Binding Capacity in AM (06.09.18 @ 10:21)    % Saturation, Iron: 20 %    Iron - Total Binding Capacity.: 191 ug/dL    Iron Total, Serum: 39 ug/dL    Unsaturated Iron Binding Capacity: 152 ug/dL    Ferritin, Serum: 102: Note: New reference ranges effective 04/16/2018. ng/mL      06-11    139  |  103  |  18  ----------------------------<  150<H>  4.1   |  25  |  0.42<L>    Ca    8.5      11 Jun 2018 07:43    LIVER FUNCTIONS - ( 09 Jun 2018 10:21 )  Alb: x     / Pro: x     / ALK PHOS: x     / ALT: x     / AST: x     / GGT: 16 U/L         RADIOLOGY & ADDITIONAL TESTS:  < from: US Abdomen Complete (06.08.18 @ 10:51) >    FINDINGS:  Liver: 12 cm. Within normal limits.    Bile ducts: Mild intrahepatic ductal dilatation.. Common bile duct   measures 7 mm, no evidence of choledocholithiasis.    Gallbladder: No evidence of cholelithiasis, gallbladder wall edema, or   pericholecystic fluid.    Negative sonographic Zaidi sign.    Pancreas: Visualized portions are within normal limits.    Spleen: 7.2 cm. Within normal limits.    Right kidney: 10 cm, with mild increased cortical echogenicity. Simple   appearing lower pole cyst measuring 0.9 x 0.9 x 1.0 cm.  No hydronephrosis or calculus.    Left kidney: 10 cm, with mild increased cortical echogenicity.  Simple   appearing upper pole cyst measuring 0.9 x 1.0 x 1.0 cm.  No hydronephrosis or calculus.    Ascites: None.    Aorta and IVC: Atherosclerotic calcifications of the visualized aorta.     IMPRESSION:   No evidence of acute cholecystitis or choledocholithiasis.
Patient is a 67y old  Female who presents with a chief complaint of ftt (07 Jun 2018 10:40)      Vascular Surgery Attending Progress Note    Interval HPI: pt w/o c/o     Medications:  ascorbic acid 500 milliGRAM(s) Oral daily  dextrose 40% Gel 15 Gram(s) Oral once PRN  dextrose 5%. 1000 milliLiter(s) IV Continuous <Continuous>  dextrose 50% Injectable 12.5 Gram(s) IV Push once  dextrose 50% Injectable 25 Gram(s) IV Push once  dextrose 50% Injectable 25 Gram(s) IV Push once  enoxaparin Injectable 40 milliGRAM(s) SubCutaneous daily  glucagon  Injectable 1 milliGRAM(s) IntraMuscular once PRN  insulin lispro (HumaLOG) corrective regimen sliding scale   SubCutaneous three times a day before meals  insulin lispro (HumaLOG) corrective regimen sliding scale   SubCutaneous at bedtime  lisinopril 5 milliGRAM(s) Oral daily  meclizine 25 milliGRAM(s) Oral daily PRN  metoprolol tartrate 25 milliGRAM(s) Oral two times a day  morphine  - Injectable 2 milliGRAM(s) IV Push daily PRN  multivitamin 1 Tablet(s) Oral daily  nystatin Cream 1 Application(s) Topical two times a day  pantoprazole    Tablet 40 milliGRAM(s) Oral before breakfast  piperacillin/tazobactam IVPB. 3.375 Gram(s) IV Intermittent every 8 hours      Vital Signs Last 24 Hrs  T(C): 36.5 (10 Avel 2018 11:40), Max: 36.9 (09 Jun 2018 20:01)  T(F): 97.7 (10 Avel 2018 11:40), Max: 98.4 (09 Jun 2018 20:01)  HR: 102 (10 Avel 2018 11:40) (88 - 112)  BP: 124/77 (10 Avel 2018 11:40) (99/52 - 124/77)  BP(mean): --  RR: 18 (10 Avel 2018 11:40) (18 - 18)  SpO2: 100% (10 Avel 2018 11:40) (97% - 100%)  I&O's Summary    09 Jun 2018 07:01  -  10 Avel 2018 07:00  --------------------------------------------------------  IN: 920 mL / OUT: 0 mL / NET: 920 mL    10 Avel 2018 07:01  -  10 Avel 2018 18:40  --------------------------------------------------------  IN: 480 mL / OUT: 0 mL / NET: 480 mL        Physical Exam:  Neuro  A&Ox3 VSS  Abd soft nt nd at this time   Vascular:  stable     LABS:                        9.9    12.34 )-----------( 520      ( 10 Avel 2018 11:54 )             32.3         TPro  6.2  /  Alb  2.5<L>  /  TBili  0.2  /  DBili  <0.1  /  AST  12  /  ALT  9<L>  /  AlkPhos  165<H>  06-09        JAYMIE LAGUNAS MD  [phone]
Patient is a 67y old  Female who presents with a chief complaint of ftt (07 Jun 2018 10:40)      Vascular Surgery Attending Progress Note    Interval HPI: pt w/o c/o at this time     Medications:  ascorbic acid 500 milliGRAM(s) Oral daily  dextrose 40% Gel 15 Gram(s) Oral once PRN  dextrose 5%. 1000 milliLiter(s) IV Continuous <Continuous>  dextrose 50% Injectable 12.5 Gram(s) IV Push once  dextrose 50% Injectable 25 Gram(s) IV Push once  dextrose 50% Injectable 25 Gram(s) IV Push once  enoxaparin Injectable 40 milliGRAM(s) SubCutaneous daily  glucagon  Injectable 1 milliGRAM(s) IntraMuscular once PRN  insulin lispro (HumaLOG) corrective regimen sliding scale   SubCutaneous three times a day before meals  insulin lispro (HumaLOG) corrective regimen sliding scale   SubCutaneous at bedtime  lisinopril 5 milliGRAM(s) Oral daily  meclizine 25 milliGRAM(s) Oral daily PRN  metoprolol tartrate 25 milliGRAM(s) Oral two times a day  morphine  - Injectable 2 milliGRAM(s) IV Push daily PRN  multivitamin 1 Tablet(s) Oral daily  nystatin Cream 1 Application(s) Topical two times a day  pantoprazole    Tablet 40 milliGRAM(s) Oral before breakfast  piperacillin/tazobactam IVPB. 3.375 Gram(s) IV Intermittent every 8 hours      Vital Signs Last 24 Hrs  T(C): 36.9 (09 Jun 2018 20:01), Max: 36.9 (09 Jun 2018 20:01)  T(F): 98.4 (09 Jun 2018 20:01), Max: 98.4 (09 Jun 2018 20:01)  HR: 112 (09 Jun 2018 20:01) (89 - 112)  BP: 99/52 (09 Jun 2018 20:01) (96/58 - 117/64)  BP(mean): --  RR: 18 (09 Jun 2018 20:01) (18 - 18)  SpO2: 97% (09 Jun 2018 20:01) (95% - 99%)  I&O's Summary    08 Jun 2018 07:01  -  09 Jun 2018 07:00  --------------------------------------------------------  IN: 1020 mL / OUT: 0 mL / NET: 1020 mL    09 Jun 2018 07:01  -  09 Jun 2018 20:51  --------------------------------------------------------  IN: 480 mL / OUT: 0 mL / NET: 480 mL        Physical Exam:  Neuro  A&Ox3 VSS  Abd soft nt nd   Vascular:  stable     LABS:                        8.7    11.39 )-----------( 501      ( 09 Jun 2018 08:25 )             28.4     06-08    138  |  104  |  36<H>  ----------------------------<  122<H>  4.0   |  26  |  0.54    Ca    9.0      08 Jun 2018 07:10    TPro  6.2  /  Alb  2.5<L>  /  TBili  0.2  /  DBili  <0.1  /  AST  12  /  ALT  9<L>  /  AlkPhos  165<H>  06-09        JAYMIE LAGUNAS MD  [phone]
SUBJECTIVE: Pt seen, chart reviewed.  Daughter at bedside- please w/ improvement in just a few days of her mom's skin- has hopes that in time all will be good.  Pt noted much less pain.  D/C planning. D/w daughter tx plan & importance of f/u in office.  All questions asked and answered to pt's & her daughter's satisfaction  less redness noted.  No f/c/s. no warmth, odor noted.       REVIEW OF SYSTEMS    Skin/ MSK: see HPI  All other systems negative    Physical Exam:  Vital Signs Last 24 Hrs  T(C): 36.9 (11 Jun 2018 12:07), Max: 36.9 (11 Jun 2018 12:07)  T(F): 98.4 (11 Jun 2018 12:07), Max: 98.4 (11 Jun 2018 12:07)  HR: 82 (11 Jun 2018 12:07) (82 - 89)  BP: 102/60 (11 Jun 2018 12:07) (102/60 - 113/67)  BP(mean): --  RR: 18 (11 Jun 2018 12:07) (17 - 18)  SpO2: 97% (11 Jun 2018 12:07) (96% - 97%)    General Appearance:  NAD, A&Ox3, MO/ frail  Versa Care P500    Musculoskeletal/Vascular: limited FROM/ passive ROM x4  (+)foot drop  BLE edema equal  (+) DP/PT pulses palpable  BLE equally warm  no acute ischemia noted    Lt lateral malleolus w/ stage 4 pressure ulcer  (+)pale granular tissue w/ thin layer of fibrinous exudate  (+) serosanguinous drainage   (+) exposed bone  No odor,  increased warmth, nontender, erythema, induration, fluctuance      Skin:  dry &frail,  ecchymosis w/o hematoma    Improving- Anterior thighs/ perineum / groin folds/ abdominal pannus/ inframammary folds w/ moisture dermatitis w/o skin loss mildly tender w/o drainage    Bilateral buttocks and posterior thighs excoriated w/ blanchable erythematous -improvement noted  w/ multiple smaller areas of partial thickness skin loss w/ fibrinous exudate & moist granular tissue    Rt ischium moist & granular stage 3 pressure ulcer   (+) serosanguinous drainage & tender    No odor,  increased warmth, induration, fluctuance          LABS:                        8.4    11.71 )-----------( 471      ( 11 Jun 2018 09:59 )             26.9         RADIOLOGY & ADDITIONAL STUDIES:  CULTURES:  < from: Xray Ankle Complete 3 Views, Left (06.08.18 @ 21:36) >  FINDINGS:    Soft tissue ulceration is identified along the lateral aspect of the   ankle joint. No tracking subcutaneous emphysema is identified. No   cortical destruction or periosteal new bone formation. Diffuse   osteopenia. No acute fracture or dislocation. Healed fracture deformity   of the fifth metatarsal. Vascular calcifications. Radiopaque linear   foreign body is identified adjacent to the head of the first metatarsal   and the dorsum of the foot.        IMPRESSION:  1.  No radiographic evidence of advanced osteomyelitis.  2.  Radiopaque foreign body in the dorsum of the foot adjacent to the   head of the first metatarsal.    < from: VA Duplex Lower Ext Vein Scan, Bilat (06.07.18 @ 16:49) >  FINDINGS:    There is normal compressibility of the bilateral common femoral, femoral   and popliteal veins. No calf vein thrombosis is detected.    Doppler examination shows normal spontaneous and phasic flow.    There is bilateral calf edema.    IMPRESSION:     No evidence of bilateral lower extremity deep venous thrombosis.
no  complaints    REVIEW OF SYSTEMS:  CONSTITUTIONAL: No weakness,  no   fevers      RESPIRATORY:  No    shortness of breath  , no  wheezing  CARDIOVASCULAR: No chest pain,   no  palpitations, no  cough  GASTROINTESTINAL: No abdominal  pain, no  vomiting, no  diarrhea  NEUROLOGICAL: No  focal  weakness    MEDICATIONS  (STANDING):  ascorbic acid 500 milliGRAM(s) Oral daily  dextrose 5%. 1000 milliLiter(s) (50 mL/Hr) IV Continuous <Continuous>  dextrose 50% Injectable 12.5 Gram(s) IV Push once  dextrose 50% Injectable 25 Gram(s) IV Push once  dextrose 50% Injectable 25 Gram(s) IV Push once  enoxaparin Injectable 40 milliGRAM(s) SubCutaneous daily  insulin lispro (HumaLOG) corrective regimen sliding scale   SubCutaneous three times a day before meals  insulin lispro (HumaLOG) corrective regimen sliding scale   SubCutaneous at bedtime  lisinopril 5 milliGRAM(s) Oral daily  metoprolol tartrate 25 milliGRAM(s) Oral two times a day  multivitamin 1 Tablet(s) Oral daily  nystatin Cream 1 Application(s) Topical two times a day  pantoprazole    Tablet 40 milliGRAM(s) Oral before breakfast  piperacillin/tazobactam IVPB. 3.375 Gram(s) IV Intermittent every 8 hours    MEDICATIONS  (PRN):  dextrose 40% Gel 15 Gram(s) Oral once PRN Blood Glucose LESS THAN 70 milliGRAM(s)/deciliter  glucagon  Injectable 1 milliGRAM(s) IntraMuscular once PRN Glucose LESS THAN 70 milligrams/deciliter  meclizine 25 milliGRAM(s) Oral daily PRN Dizziness  morphine  - Injectable 2 milliGRAM(s) IV Push daily PRN Moderate Pain (4 - 6)      Vital Signs Last 24 Hrs  T(C): 36.7 (10 Avel 2018 06:07), Max: 36.9 (09 Jun 2018 20:01)  T(F): 98.1 (10 Avel 2018 06:07), Max: 98.4 (09 Jun 2018 20:01)  HR: 88 (10 Avel 2018 06:07) (88 - 112)  BP: 118/64 (10 Avel 2018 06:07) (96/58 - 118/64)  BP(mean): --  RR: 18 (10 Avel 2018 06:07) (18 - 18)  SpO2: 98% (10 Avel 2018 06:07) (95% - 98%)  CAPILLARY BLOOD GLUCOSE      POCT Blood Glucose.: 139 mg/dL (10 Avel 2018 08:21)  POCT Blood Glucose.: 205 mg/dL (09 Jun 2018 21:43)  POCT Blood Glucose.: 127 mg/dL (09 Jun 2018 17:34)  POCT Blood Glucose.: 189 mg/dL (09 Jun 2018 12:18)    I&O's Summary    09 Jun 2018 07:01  -  10 Avel 2018 07:00  --------------------------------------------------------  IN: 920 mL / OUT: 0 mL / NET: 920 mL        PHYSICAL EXAM:  HEAD:  Atraumatic, Normocephalic  NECK: Supple, No   JVD  CHEST/LUNG:   no     rales,     no,    rhonchi  HEART: Regular rate and rhythm;         murmur  ABDOMEN: Soft, Nontender, ;   EXTREMITIES:      c/c   edema  NEUROLOGY:  alert    LABS:                        8.7    11.39 )-----------( 501      ( 09 Jun 2018 08:25 )             28.4         TPro  6.2  /  Alb  2.5<L>  /  TBili  0.2  /  DBili  <0.1  /  AST  12  /  ALT  9<L>  /  AlkPhos  165<H>  06-09                  Hemoglobin A1C, Whole Blood: 8.1 % (06-08 @ 09:27)            Consultant(s) Notes Reviewed:      Care Discussed with Consultants/Other Providers:
pt resting. no  compalints    REVIEW OF SYSTEMS:  CONSTITUTIONAL: No weakness,  no   fevers      RESPIRATORY:  No    shortness of breath  , no  wheezing  CARDIOVASCULAR: No chest pain,   no  palpitations, no  cough  GASTROINTESTINAL: No abdominal  pain, no  vomiting, no  diarrhea  NEUROLOGICAL: No  focal  weakness    MEDICATIONS  (STANDING):  ascorbic acid 500 milliGRAM(s) Oral daily  dextrose 5%. 1000 milliLiter(s) (50 mL/Hr) IV Continuous <Continuous>  dextrose 50% Injectable 12.5 Gram(s) IV Push once  dextrose 50% Injectable 25 Gram(s) IV Push once  dextrose 50% Injectable 25 Gram(s) IV Push once  enoxaparin Injectable 40 milliGRAM(s) SubCutaneous daily  insulin lispro (HumaLOG) corrective regimen sliding scale   SubCutaneous three times a day before meals  insulin lispro (HumaLOG) corrective regimen sliding scale   SubCutaneous at bedtime  lisinopril 5 milliGRAM(s) Oral daily  metoprolol tartrate 25 milliGRAM(s) Oral two times a day  multivitamin 1 Tablet(s) Oral daily  nystatin Cream 1 Application(s) Topical two times a day  pantoprazole    Tablet 40 milliGRAM(s) Oral before breakfast  piperacillin/tazobactam IVPB. 3.375 Gram(s) IV Intermittent every 8 hours    MEDICATIONS  (PRN):  dextrose 40% Gel 15 Gram(s) Oral once PRN Blood Glucose LESS THAN 70 milliGRAM(s)/deciliter  glucagon  Injectable 1 milliGRAM(s) IntraMuscular once PRN Glucose LESS THAN 70 milligrams/deciliter  meclizine 25 milliGRAM(s) Oral daily PRN Dizziness  morphine  - Injectable 2 milliGRAM(s) IV Push daily PRN Moderate Pain (4 - 6)      Vital Signs Last 24 Hrs  T(C): 36.6 (11 Jun 2018 05:00), Max: 36.7 (10 Avel 2018 20:30)  T(F): 97.8 (11 Jun 2018 05:00), Max: 98 (10 Avel 2018 20:30)  HR: 89 (11 Jun 2018 05:00) (87 - 102)  BP: 113/67 (11 Jun 2018 05:00) (105/68 - 124/77)  BP(mean): --  RR: 17 (11 Jun 2018 05:00) (17 - 18)  SpO2: 96% (11 Jun 2018 05:00) (96% - 100%)  CAPILLARY BLOOD GLUCOSE      POCT Blood Glucose.: 133 mg/dL (11 Jun 2018 08:29)  POCT Blood Glucose.: 204 mg/dL (10 Avel 2018 22:14)  POCT Blood Glucose.: 182 mg/dL (10 Avel 2018 17:28)  POCT Blood Glucose.: 178 mg/dL (10 Avel 2018 12:33)    I&O's Summary    10 Avel 2018 07:01  -  11 Jun 2018 07:00  --------------------------------------------------------  IN: 1205 mL / OUT: 0 mL / NET: 1205 mL        PHYSICAL EXAM:  HEAD:  Atraumatic, Normocephalic  NECK: Supple, No   JVD  CHEST/LUNG:   no     rales,     no,    rhonchi  HEART: Regular rate and rhythm;         murmur  ABDOMEN: Soft, Nontender, ;   EXTREMITIES:   less      edema  NEUROLOGY:  alert    LABS:                        9.9    12.34 )-----------( 520      ( 10 Avel 2018 11:54 )             32.3     06-11    139  |  103  |  18  ----------------------------<  150<H>  4.1   |  25  |  0.42<L>    Ca    8.5      11 Jun 2018 07:43                    Hemoglobin A1C, Whole Blood: 8.1 % (06-08 @ 09:27)            Consultant(s) Notes Reviewed:      Care Discussed with Consultants/Other Providers:
resting,  no  cp.sob    REVIEW OF SYSTEMS:  CONSTITUTIONAL: No weakness,  no   fevers      RESPIRATORY:  No    shortness of breath  , no  wheezing  CARDIOVASCULAR: No chest pain,   no  palpitations, no  cough  GASTROINTESTINAL: No abdominal  pain, no  vomiting, no  diarrhea  NEUROLOGICAL: No  focal  weakness    MEDICATIONS  (STANDING):  dextrose 5%. 1000 milliLiter(s) (50 mL/Hr) IV Continuous <Continuous>  dextrose 50% Injectable 12.5 Gram(s) IV Push once  dextrose 50% Injectable 25 Gram(s) IV Push once  dextrose 50% Injectable 25 Gram(s) IV Push once  enoxaparin Injectable 40 milliGRAM(s) SubCutaneous daily  insulin lispro (HumaLOG) corrective regimen sliding scale   SubCutaneous three times a day before meals  insulin lispro (HumaLOG) corrective regimen sliding scale   SubCutaneous at bedtime  lisinopril 5 milliGRAM(s) Oral daily  metoprolol tartrate 25 milliGRAM(s) Oral two times a day  nystatin Cream 1 Application(s) Topical two times a day  pantoprazole    Tablet 40 milliGRAM(s) Oral before breakfast  piperacillin/tazobactam IVPB. 3.375 Gram(s) IV Intermittent every 8 hours    MEDICATIONS  (PRN):  dextrose 40% Gel 15 Gram(s) Oral once PRN Blood Glucose LESS THAN 70 milliGRAM(s)/deciliter  glucagon  Injectable 1 milliGRAM(s) IntraMuscular once PRN Glucose LESS THAN 70 milligrams/deciliter  morphine  - Injectable 2 milliGRAM(s) IV Push daily PRN Moderate Pain (4 - 6)      Vital Signs Last 24 Hrs  T(C): 36.5 (2018 11:51), Max: 36.8 (2018 17:54)  T(F): 97.7 (2018 11:51), Max: 98.3 (2018 17:54)  HR: 88 (2018 11:51) (71 - 107)  BP: 112/55 (2018 11:51) (112/55 - 128/81)  BP(mean): --  RR: 18 (2018 11:51) (18 - 18)  SpO2: 97% (2018 11:51) (95% - 98%)  CAPILLARY BLOOD GLUCOSE      POCT Blood Glucose.: 201 mg/dL (2018 12:17)  POCT Blood Glucose.: 104 mg/dL (2018 08:15)  POCT Blood Glucose.: 251 mg/dL (2018 22:26)  POCT Blood Glucose.: 176 mg/dL (2018 17:43)  POCT Blood Glucose.: 114 mg/dL (2018 13:06)    I&O's Summary    2018 07:01  -  2018 07:00  --------------------------------------------------------  IN: 440 mL / OUT: 0 mL / NET: 440 mL        PHYSICAL EXAM:  HEAD:  Atraumatic, Normocephalic  NECK: Supple, No   JVD  CHEST/LUNG:   no     rales,     no,    rhonchi  HEART: Regular rate and rhythm;         murmur  ABDOMEN: Soft, Nontender, ;   EXTREMITIES:        edema  NEUROLOGY:  alert    LABS:                        8.9    9.20  )-----------( 478      ( 2018 09:27 )             28.8         138  |  104  |  36<H>  ----------------------------<  122<H>  4.0   |  26  |  0.54    Ca    9.0      2018 07:10    TPro  6.2  /  Alb  2.6<L>  /  TBili  0.1<L>  /  DBili  <0.1  /  AST  11  /  ALT  10  /  AlkPhos  181<H>      PT/INR - ( 2018 03:11 )   PT: 11.3 sec;   INR: 1.04 ratio         PTT - ( 2018 03:11 )  PTT:34.9 sec      Urinalysis Basic - ( 2018 03:39 )    Color: Yellow / Appearance: Clear / S.025 / pH: x  Gluc: x / Ketone: Trace  / Bili: Negative / Urobili: Negative   Blood: x / Protein: 30 mg/dL / Nitrite: Negative   Leuk Esterase: Negative / RBC: x / WBC 3-5 /HPF   Sq Epi: x / Non Sq Epi: OCC /HPF / Bacteria: x           @ 03:11  4.3  41              Consultant(s) Notes Reviewed:      Care Discussed with Consultants/Other Providers:

## 2018-06-11 NOTE — DISCHARGE NOTE ADULT - PATIENT PORTAL LINK FT
You can access the [facility], offered by [facility], by registering with the following website: [email or web address]

## 2018-06-11 NOTE — CHART NOTE - NSCHARTNOTEFT_GEN_A_CORE
Measure and deliver [place] custom fit LSO. Reviewed application skin precautions and care. Written instructions and contact information left bedside. To notify office with any issues questions or concerns.  Allen GUTIERRES.  [facility]  [phone]

## 2018-06-11 NOTE — DISCHARGE NOTE ADULT - ADDITIONAL INSTRUCTIONS
Follow up with General Internal Medicine Clinic:  [address], [address],  [place] [address].   [phone] Follow up with General Internal Medicine Clinic:  [address], [address],  [place] [address].   [phone]  Continue with Brace when out of bed

## 2018-06-11 NOTE — PROGRESS NOTE ADULT - ASSESSMENT
A/P:  67yoF  with h/o  htn,  dm  2, mlple falls, now  wheel chair bound, functional  quadriplegia after traumatic fall, from window, 20  yrs  ago admitted w/ now  with ftt/ sacral wound    Extreme Moisture Dermatitis - 3 Day CAVILON w/ pericare- May need to switch to Daily CAVILON upon discharge home.  Rt Ischial stage 3 pressure ulcer- con't w/ AQUACEL dressing  Lt ankle stage 4 lateral malleolar pressure ulcer- con't medihoney dressing  XRay, Duplex reviewed  BLE elevation  Abx per Medicine/ ID  Moisturize intact skin w/ SWEEN cream BID  con't Nutrition (as tolerated), Nutrition Consult  con't Offloading   con't Pericare  Care as per medicine will follow w/ you  Upon discharge f/u as outpatient at Wound [facility] [address] [facility] [phone]  D/w team & attng  Aurelia Taylor PA-C CWS [id number]  I spent 25  minutes w/ this pt of which more than 50% of the time was spent counseling & coordinating care of this pt.

## 2018-06-11 NOTE — PROGRESS NOTE ADULT - ATTENDING COMMENTS
Kirit Dozier MD, FACP, FACG, AGAF  [facility]  [phone]
as above

## 2018-06-11 NOTE — DISCHARGE NOTE ADULT - SECONDARY DIAGNOSIS.
Ankle wound Wound Type 2 diabetes mellitus without complication, without long-term current use of insulin

## 2018-06-11 NOTE — PROGRESS NOTE ADULT - ASSESSMENT
pt    with h/o  htn,    dm  2, mlple falls, now  wheel chair bound.  beb bound, functional  quadriplegia  , s/p  h/o traumatic fall, from window, 20  yrs  ago   now  with ftt/ sacral  decubiti.    on zosyn, last day is  monday   wound care  and ID  , saw   pt  anemia, gi  called  dvt ppx  med list  not available  from [place],  family  at  bedside now,  states she  takes  bp  meds/ cozaar   and  metformin  family also  wants placement  doppler  legs.  distal ulnar fx left  arm, ortho called,    plan is  non operative  seen by wound  care/ neuro surg for comp fx spine/ and vascular, no intervention   pt doing better  severe  protein  calorie   malnutrition  completed  ab,  today  dc  planning to rehab   today or  tuesday, spoke   to  daughter, states  that mom has  no insurance  and  wants her  placed  care cordtn to  f./p  with  daughter

## 2018-06-11 NOTE — DISCHARGE NOTE ADULT - MEDICATION SUMMARY - MEDICATIONS TO TAKE
I will START or STAY ON the medications listed below when I get home from the hospital:    lisinopril 5 mg oral tablet  -- 1 tab(s) by mouth once a day  -- Indication: For blood pressure    Glucophage 500 mg oral tablet  -- 1 tab(s) by mouth 2 times a day   -- Check with your doctor before becoming pregnant.  Do not drink alcoholic beverages when taking this medication.  It is very important that you take or use this exactly as directed.  Do not skip doses or discontinue unless directed by your doctor.  Obtain medical advice before taking any non-prescription drugs as some may affect the action of this medication.  Take with food or milk.    -- Indication: For diabetes- home med    pravastatin 40 mg oral tablet  -- 1 tab(s) by mouth once a day  -- Indication: For Cholesterol- home med    metoprolol tartrate 25 mg oral tablet  -- 1 tab(s) by mouth 2 times a day  -- Indication: For blood pressure    nystatin 100,000 units/g topical cream  -- 1 application on skin 2 times a day  -- Indication: For topical cream    Multiple Vitamins oral tablet  -- 1 tab(s) by mouth once a day  -- Indication: For vitamin supplement    ascorbic acid 500 mg oral tablet  -- 1 tab(s) by mouth once a day  -- Indication: For vitamin supplement I will START or STAY ON the medications listed below when I get home from the hospital:    Percocet 5/325 oral tablet  -- 1 tab(s) by mouth every 6 hours, As Needed -for moderate pain MDD:4  -- Indication: For moderate pain    losartan 100 mg oral tablet  -- 1 tab(s) by mouth once a day  -- Indication: For blood pressure    Glucophage 500 mg oral tablet  -- 1 tab(s) by mouth 2 times a day   -- Check with your doctor before becoming pregnant.  Do not drink alcoholic beverages when taking this medication.  It is very important that you take or use this exactly as directed.  Do not skip doses or discontinue unless directed by your doctor.  Obtain medical advice before taking any non-prescription drugs as some may affect the action of this medication.  Take with food or milk.    -- Indication: For diabetes- home med    pravastatin 40 mg oral tablet  -- 1 tab(s) by mouth once a day  -- Indication: For Cholesterol- home med    metoprolol tartrate 25 mg oral tablet  -- 1 tab(s) by mouth 2 times a day  -- Indication: For blood pressure    nystatin 100,000 units/g topical cream  -- 1 application on skin 2 times a day  -- Indication: For topical cream    Multiple Vitamins oral tablet  -- 1 tab(s) by mouth once a day  -- Indication: For vitamin supplement    ascorbic acid 500 mg oral tablet  -- 1 tab(s) by mouth once a day  -- Indication: For vitamin supplement

## 2018-06-11 NOTE — DISCHARGE NOTE ADULT - CARE PROVIDER_API CALL
Medicine Clinic,   Phone: (   )    -  Fax: (   )    - Tiffanie Colmenares (MD), Internal Medicine  [address] [place] [address]  Phone: [phone]  Fax: [phone]

## 2018-06-11 NOTE — DISCHARGE NOTE ADULT - HOME CARE AGENCY
[facility] ([phone]). A representative will contact you to arrange your initial visit.

## 2018-06-11 NOTE — PROGRESS NOTE ADULT - ASSESSMENT
67 year old female with PMH HTN, DM, decubiti, functional quadriplegia s/p fall/trauma now admitted s/p fall from wheelchair, FTT, decubiti    Noted to have drop in Hgb (normocytic) and not clear what is pt's baseline Hgb. Currently without overt/brisk GI bleed at this time . Indices c/w AOCD  - Monitor CBC and BMs    Distended GB with CBD dilation on CT, but clinically with benign abdominal exam and has been tolerating PO diet; GGT WNL. No acute hepatobiliary pathology on US    FTT, extensive wounds  -wound care following  -PO supplements to increase protein intake    Jose Mojica PA-C    [facility]  [phone]

## 2018-06-19 PROBLEM — Z00.00 ENCOUNTER FOR PREVENTIVE HEALTH EXAMINATION: Status: ACTIVE | Noted: 2018-06-19

## 2018-06-27 ENCOUNTER — APPOINTMENT (OUTPATIENT)
Dept: INTERNAL MEDICINE | Facility: CLINIC | Age: 68
End: 2018-06-27

## 2021-10-05 NOTE — PATIENT PROFILE ADULT. - PRO ARRIVE FROM
Ambulatory Care Coordination Note  10/5/2021  CM Risk Score: 5  Charlson 10 Year Mortality Risk Score: 98%     ACC: Paul Crenshaw RN    Summary Note: Spoke with Beau Lassiter. States she is feeling better and her knee pain has improved. She feels her resting in [place] helped with her knee pain. She is now back home and doing well. Confirmed date and time of PCP appt scheduled for next week. States she feels she is doing well on her depression medication and feels that her mood has improved. Denies side effects from medication. Weight management referral placed on hold until knee feeling better. She would like to take care of one thing at a time. Encouraged her to call with any questions or changes in condition. Plan  -reinforce education completed  -ensure behavioral health needs are met  -work towards graduation one no new barriers/all education completed. Congestive Heart Failure Assessment    Are you currently restricting fluids?: No Restriction  Do you understand a low sodium diet?: Yes  Do you understand how to read food labels?: Yes  How many restaurant meals do you eat per week?: 1-2  Do you salt your food before tasting it?: No         Symptoms:  CHF associated angina: Neg, CHF associated dyspnea on exertion: Neg, CHF associated fatigue: Neg, CHF associated leg swelling: Neg, CHF associated orthostatic hypotension: Neg, CHF associated PND: Neg, CHF associated shortness of breath: Neg, CHF associated weakness: Neg      Symptom course: stable  Salt intake watch compared to last visit: stable      and   General Assessment    Do you have any symptoms that are causing concern?: Yes  Progression since Onset: Rapidly Improving  Reported Symptoms: Other (Comment: knee pain improved)               Care Coordination Interventions    Program Enrollment: Complex Care  Referral from Primary Care Provider: No  Suggested Interventions and Community Resources  Behavorial Health:  In Process (Comment: Anival Villegas [place] [facility] referral 5-3-21)  Fall Risk Prevention: In Process  Disease Specific Clinic: Completed (Comment: CHF clinic)  Home Health Services: Declined  Medication Assistance Program: Declined  Senior Services: Declined  Zone Management Tools: In Process         Goals Addressed                    This Visit's Progress      Conditions and Symptoms (pt-stated)   On track      I will schedule office visits, as directed by my provider. I will keep my appointment or reschedule if I have to cancel. I will notify my provider of any barriers to my plan of care. I will follow my Zone Management tool to seek urgent or emergent care. I will notify my provider of any symptoms that indicate a worsening of my condition. CHF  Barriers: overwhelmed by complexity of regimen  Plan for overcoming my barriers: family, ACM, CHF clinic support  Confidence: 8/10  Anticipated Goal Completion Date: 8/3/21 Update 9/4/21 Update 10/16/21              Prior to Admission medications    Medication Sig Start Date End Date Taking? Authorizing Provider   traMADol (ULTRAM) 50 MG tablet Take 1 tablet by mouth every 6 hours as needed for Pain for up to 7 days.  9/28/21 10/5/21 Yes ANGELIA Dean CNP   Cholecalciferol 50 MCG (2000 UT) CAPS Take 50 mcg by mouth daily 9/20/21  Yes Stas Frank PA-C   metFORMIN (GLUCOPHAGE-XR) 500 MG extended release tablet Take 1 tablet by mouth once daily with breakfast 9/16/21  Yes ANGELIA Dean CNP   furosemide (LASIX) 20 MG tablet TAKE 1 TABLET BY MOUTH ONCE DAILY AS NEEDED FOR LEG SWELLING, WEIGHT GAIN 9/16/21  Yes ANGELIA Dean CNP   buPROPion (WELLBUTRIN XL) 150 MG extended release tablet Take 1 tablet by mouth every morning 9/13/21  Yes ANGELIA Dean CNP   CPAP Machine MISC by Does not apply route Please change CPAP pressure to auto 11-15 cm H20. 9/9/21  Yes Stas Frank PA-C   omeprazole (PRILOSEC) 40 MG delayed release capsule Take 1 capsule by mouth every morning (before breakfast) 9/9/21  Yes ANGELIA Troncoso CNP   venlafaxine (EFFEXOR XR) 75 MG extended release capsule Take 1 capsule by mouth daily (take along with 150 mg Effexor for total dose of 225 mg) 8/24/21  Yes ANGELIA Reyes CNP   letrozole [facility]) 2.5 MG tablet Take 1 tablet by mouth daily 7/20/21  Yes Yaya Ross MD   polyethylene glycol (GLYCOLAX) 17 GM/SCOOP powder DISSOLVE & TAKE 1 CAPFUL ONCE DAILY 6/28/21  Yes Historical Provider, MD   metoprolol succinate (TOPROL XL) 25 MG extended release tablet Take 0.5 tablets by mouth daily 5/19/21  Yes Shilpi Noriega MD   EUTHYROX 100 MCG tablet Take 1 tablet by mouth once daily 4/23/21  Yes ANGELIA Reyes CNP   digoxin (LANOXIN) 125 MCG tablet Take 1 tablet by mouth daily 3/1/21  Yes Shilpi Noriega MD   ENTRESTO 49-51 MG per tablet Take 1 tablet by mouth twice daily 1/25/21  Yes ANGELIA Ruano CNP   atorvastatin (LIPITOR) 40 MG tablet Take 1 tablet by mouth nightly 12/11/20  Yes ANGELIA Reyes CNP   venlafaxine (EFFEXOR XR) 150 MG extended release capsule Take 1 capsule by mouth daily 10/20/20  Yes ANGELIA Reyes CNP   aspirin 81 MG chewable tablet Take 1 tablet by mouth daily 7/11/20  Yes Sheri Manley MD   acetaminophen (TYLENOL) 500 MG tablet Take 500 mg by mouth every 6 hours as needed for Pain   Yes Historical Provider, MD       Future Appointments   Date Time Provider Braden Rothman   10/6/2021 10:00 AM STR [address] HOD   10/11/2021  8:20 AM ANGELIA Reyes - [address] [address] [address] [facility] - [address]   10/18/2021  8:00 AM ELISE Roque [address]   10/21/2021  8:30 AM ANGELIA Troncoso CNP [facility]   11/9/2021  9:30 AM [address]   12/1/2021  1:00 PM ANGELIA Ruano - CNP N SRPX [address]   12/1/2021  1:00 PM SCHEDULE, SRPS PACER NURSE N [address] [address]   1/24/2022 10:00 AM Yaya Ross MD N home Oncology [facility]   5/26/2022  2:00 PM Juan Camargo MD N SRPX Heart [address] [facility]

## 2022-05-24 NOTE — PHYSICAL THERAPY INITIAL EVALUATION ADULT - TRANSFER TRAINING, PT EVAL
TRANSFER - OUT REPORT:    [facility]  being transferred to room 110for routine progression of care       Report consisted of patients Situation, Background, Assessment and   Recommendations(SBAR). Information from the following report(s) SBAR, Kardex, ED Summary, Procedure Summary, Intake/Output, MAR, Recent Results and Med Rec Status was reviewed with the receiving nurse. Lines:   Peripheral IV 05/24/22 Anterior; Left Forearm (Active)        Opportunity for questions and clarification was provided.       Patient transported with:   Monitor  O2 @ 2 liters GOAL: Pt will perform ALL transfers with min assist, w/use of appropriate assistive device as needed, in 4 weeks.

## 2022-06-08 NOTE — DIETITIAN INITIAL EVALUATION ADULT. - NS FNS SUPPLEMENTS
----- Message from Kelton Farley M.D. sent at 6/7/2022  5:51 PM PDT -----  2 view chest x-ray shows no suggestion of lung mass.  
Phone Number Called: [phone] (home)       Call outcome: Spoke to patient regarding message below.    Message: Spoke with patient regarding results. She understood and did not have any questions. She also asked to schedule a pap because she said she was due   
Glucerna Shake®

## 2024-05-16 NOTE — ED PROVIDER NOTE - CONTACT TIME
5/16/2024 2018  Serafin Stark  [address] [facility] [address]    To Whom It May Concern:    This is to certify that Serafin Stark was evaluated in the Urgent Care on 5/16/2024 excuse from school due to illness 5/16/24 and 5/17/24            Tip Osorio MD    [facility] [address]  [facility] CARE [address] [place] [id number] [phone]    
07-Jun-2018 05:25
